# Patient Record
Sex: MALE | Race: WHITE | NOT HISPANIC OR LATINO | Employment: FULL TIME | URBAN - NONMETROPOLITAN AREA
[De-identification: names, ages, dates, MRNs, and addresses within clinical notes are randomized per-mention and may not be internally consistent; named-entity substitution may affect disease eponyms.]

---

## 2017-01-19 ENCOUNTER — TELEPHONE (OUTPATIENT)
Dept: GASTROENTEROLOGY | Facility: CLINIC | Age: 62
End: 2017-01-19

## 2017-01-19 NOTE — TELEPHONE ENCOUNTER
Pt called stating he takes 2 teaspoons of fiver in the morning and a fiber tab in the evenings and he is still having diarrhea in the mornings.

## 2017-03-24 ENCOUNTER — OFFICE VISIT (OUTPATIENT)
Dept: GASTROENTEROLOGY | Facility: CLINIC | Age: 62
End: 2017-03-24

## 2017-03-24 ENCOUNTER — APPOINTMENT (OUTPATIENT)
Dept: LAB | Facility: HOSPITAL | Age: 62
End: 2017-03-24

## 2017-03-24 ENCOUNTER — LAB (OUTPATIENT)
Dept: LAB | Facility: HOSPITAL | Age: 62
End: 2017-03-24

## 2017-03-24 VITALS
OXYGEN SATURATION: 99 % | TEMPERATURE: 97.3 F | WEIGHT: 189 LBS | DIASTOLIC BLOOD PRESSURE: 80 MMHG | BODY MASS INDEX: 27.99 KG/M2 | HEART RATE: 76 BPM | SYSTOLIC BLOOD PRESSURE: 130 MMHG | HEIGHT: 69 IN

## 2017-03-24 DIAGNOSIS — Z72.0 TOBACCO USE: ICD-10-CM

## 2017-03-24 DIAGNOSIS — R19.7 DIARRHEA, UNSPECIFIED TYPE: ICD-10-CM

## 2017-03-24 DIAGNOSIS — R19.7 DIARRHEA, UNSPECIFIED TYPE: Primary | ICD-10-CM

## 2017-03-24 PROCEDURE — 99213 OFFICE O/P EST LOW 20 MIN: CPT | Performed by: NURSE PRACTITIONER

## 2017-03-24 RX ORDER — ATORVASTATIN CALCIUM 40 MG/1
TABLET, FILM COATED ORAL
Refills: 0 | COMMUNITY
Start: 2017-01-16 | End: 2022-06-17 | Stop reason: SDUPTHER

## 2017-03-24 RX ORDER — CETIRIZINE HYDROCHLORIDE 10 MG/1
10 TABLET ORAL DAILY
COMMUNITY

## 2017-03-24 NOTE — PROGRESS NOTES
"[facility] GASTROENTEROLOGY - OFFICE NOTE    3/24/2017    Dereje Salazar   1955    Chief Complaint   Patient presents with   • Diarrhea       pcp dr lara.      HISTORY OF PRESENT ILLNESS    Dereje Salazar is a 61 y.o. male presents for eval of diarrhea.  Has had diarrhea over the last 5 years.  This is described as in the morning he will have one solid stool then followed by at least 4 watery stools.  This can last up to around 11 AM and then he is \"good for the rest today\".  This does affect his daily activities, he states that sometimes did like to go fishing but he cannot because he knows he will have to go to the bathroom.  This can also happen before breakfast.  He usually drinks 2 mugs  of coffee in the morning.  He does smoke as well.  Denies rectal bleeding.  Denies unintentional weight loss.  Denies fevers or chills.  Denies nausea or vomiting.  Denies abdominal pain.  He does take a probiotic in the morning and has done so for at least 1 year.  He has been taking fiber supplements as November 2016 and has not found that this has helped.  He was on antibiotics in the last several months for diverticulitis.  Started pantoprazole summer of 2016.  Always other medicines he has taken for over a year.  Recently had a colonoscopy December 2016 that revealed ascending and sigmoid diverticulosis, recall colonoscopy recommended 5 years.  He had an EGD December 2016 as well which was normal.     Past Medical History:   Diagnosis Date   • Colon polyps    • Diverticulosis    • GERD (gastroesophageal reflux disease)    • Hyperlipidemia    • Hypertension    • IBS (irritable bowel syndrome)    • Knee pain        Past Surgical History:   Procedure Laterality Date   • COLONOSCOPY  2006    polyps present   • COLONOSCOPY  08/20/2010    clean based ulcerations covering ton lumen @ hepatic flexure, (5) polypectomies, moderate diverticulosis left colon   • COLONOSCOPY  11/12/2010    resolved colonic ulcerations, small " polypectomies, moderate diverticulosis   • COLONOSCOPY  12/31/2013   • COLONOSCOPY N/A 12/16/2016    Procedure: COLONOSCOPY WITH ANESTHESIA;  Surgeon: Boris Castañeda MD;  Location: [facility] ENDOSCOPY;  Service:    • ENDOSCOPY  2006    states hiatal hernia   • ENDOSCOPY N/A 12/16/2016    Procedure: ESOPHAGOGASTRODUODENOSCOPY WITH ANESTHESIA;  Surgeon: Boris Castañeda MD;  Location: [facility] ENDOSCOPY;  Service:    • KNEE SURGERY         Outpatient Prescriptions Marked as Taking for the 3/24/17 encounter (Office Visit) with CABRERA Dupont   Medication Sig Dispense Refill   • aspirin 81 MG EC tablet Take 81 mg by mouth daily.     • atorvastatin (LIPITOR) 40 MG tablet TK 1 T PO QD  0   • cetirizine (zyrTEC) 10 MG tablet Take 10 mg by mouth Daily.     • HYDROcodone-acetaminophen (NORCO) 7.5-325 MG per tablet      • Inulin (FIBER CHOICE PO) Take  by mouth Daily.     • Lactobacillus (PROBIOTIC ACIDOPHILUS PO) Take  by mouth.     • losartan-hydrochlorothiazide (HYZAAR) 100-25 MG per tablet TK 1 T PO QD  1   • Multiple Vitamins-Minerals (CENTRUM SILVER PO) Take  by mouth.     • pantoprazole (PROTONIX) 40 MG EC tablet TK 1 T PO Q MORNING  3       No Known Allergies    Social History     Social History   • Marital status:      Spouse name: N/A   • Number of children: N/A   • Years of education: N/A     Occupational History   • Not on file.     Social History Main Topics   • Smoking status: Current Every Day Smoker     Packs/day: 1.00     Types: Cigarettes   • Smokeless tobacco: Not on file   • Alcohol use Yes      Comment: every day   • Drug use: No   • Sexual activity: Not on file     Other Topics Concern   • Not on file     Social History Narrative       Family History   Problem Relation Age of Onset   • Colon cancer Father    • Colon polyps Sister    • Colon cancer Brother        Review of Systems   Constitutional: Negative for appetite change, chills, fatigue, fever and unexpected weight change.   Respiratory:  "Negative for cough, chest tightness, shortness of breath and wheezing.    Cardiovascular: Negative for chest pain and palpitations.   Gastrointestinal: Positive for diarrhea. Negative for abdominal distention, abdominal pain, anal bleeding, blood in stool, constipation, nausea, rectal pain and vomiting.        As mentioned in hpi   Genitourinary: Negative for difficulty urinating, dysuria and hematuria.   Skin: Negative for color change and rash.   Neurological: Negative for dizziness, tremors, seizures, syncope, light-headedness and headaches.   Psychiatric/Behavioral: Negative for confusion. The patient is not nervous/anxious.        Vitals:    03/24/17 0931   BP: 130/80   BP Location: Left arm   Patient Position: Sitting   Cuff Size: Adult   Pulse: 76   Temp: 97.3 °F (36.3 °C)   SpO2: 99%   Weight: 189 lb (85.7 kg)   Height: 69\" (175.3 cm)      Body mass index is 27.91 kg/(m^2).    Physical Exam   Constitutional: He is oriented to person, place, and time. He appears well-developed and well-nourished. No distress.   HENT:   Head: Normocephalic and atraumatic.   Eyes: Pupils are equal, round, and reactive to light. No scleral icterus.   Neck: Normal range of motion. Neck supple. No JVD present. No tracheal deviation present.   Cardiovascular: Normal rate, regular rhythm, normal heart sounds and intact distal pulses.  Exam reveals no gallop and no friction rub.    No murmur heard.  Pulmonary/Chest: Effort normal and breath sounds normal. No stridor. No respiratory distress. He has no wheezes. He has no rales.   Abdominal: Soft. Bowel sounds are normal. He exhibits no distension and no mass. There is no tenderness. There is no rebound and no guarding.   Musculoskeletal: Normal range of motion. He exhibits no edema or deformity.   Neurological: He is alert and oriented to person, place, and time.   Skin: Skin is warm and dry. No rash noted.   Psychiatric: He has a normal mood and affect. His behavior is normal. "   Vitals reviewed.      No results found for this or any previous visit.        ASSESSMENT AND PLAN    Dereje was seen today for diarrhea.    Diagnoses and all orders for this visit:    Diarrhea, unspecified type  -     Gastrointestinal Panel, PCR; Future  -     Fecal Leukocytes    Tobacco use        Long discussion.  Recommend trial of a fod map diet.  I have recommended that he continue taking a probiotic.  I have recommended he continue taking a fiber supplementation.  We are going to check stool studies.  We did discuss considering a trial of either Colestid or Bentyl, he declines at this time.  I have recommended that he cut out tobacco as well as caffeine and he is going to try to do that.  We will see how he does with these changes and have him back for Ov  6 wks.  in the meantime he will let me know if he changes his mind about trial of these other medications mentioned either Colestid or Bentyl.  He just had a colonoscopy so no plans to repeat that at this time since his symptoms are chronic.    Body mass index is 27.91 kg/(m^2).         CABRERA Almendarez/transcription disclaimer:  Much of this encounter note is electronic transcription/translation of spoken language to printed text.  The electronic translation of spoken language may be erroneous, or at times, nonsensical words or phrases may be inadvertently transcribed.  Although I have reviewed the note for such errors, some may still exist.    No results found for this or any previous visit.

## 2017-03-25 LAB
ADV 40+41 DNA STL QL NAA+NON-PROBE: NOT DETECTED
ASTRO TYP 1-8 RNA STL QL NAA+NON-PROBE: NOT DETECTED
C CAYETANENSIS DNA STL QL NAA+NON-PROBE: NOT DETECTED
C DIFF TOX GENS STL QL NAA+PROBE: NOT DETECTED
CAMPY SP DNA.DIARRHEA STL QL NAA+PROBE: NOT DETECTED
CRYPTOSP STL CULT: NOT DETECTED
E COLI DNA SPEC QL NAA+PROBE: NOT DETECTED
E HISTOLYT AG STL-ACNC: NOT DETECTED
EAEC PAA PLAS AGGR+AATA ST NAA+NON-PRB: NOT DETECTED
EC STX1+STX2 GENES STL QL NAA+NON-PROBE: NOT DETECTED
EPEC EAE GENE STL QL NAA+NON-PROBE: NOT DETECTED
ETEC LTA+ST1A+ST1B TOX ST NAA+NON-PROBE: NOT DETECTED
G LAMBLIA DNA SPEC QL NAA+PROBE: NOT DETECTED
NOROVIRUS GI+II RNA STL QL NAA+NON-PROBE: NOT DETECTED
P SHIGELLOIDES DNA STL QL NAA+NON-PROBE: NOT DETECTED
RV RNA STL NAA+PROBE: NOT DETECTED
SALMONELLA DNA SPEC QL NAA+PROBE: NOT DETECTED
SAPO I+II+IV+V RNA STL QL NAA+NON-PROBE: NOT DETECTED
SHIGELLA SP+EIEC IPAH ST NAA+NON-PROBE: NOT DETECTED
V CHOLERAE DNA SPEC QL NAA+PROBE: NOT DETECTED
VIBRIO DNA SPEC NAA+PROBE: NOT DETECTED
YERSINIA STL CULT: NOT DETECTED

## 2017-03-25 PROCEDURE — 87507 IADNA-DNA/RNA PROBE TQ 12-25: CPT | Performed by: NURSE PRACTITIONER

## 2017-03-25 PROCEDURE — 87205 SMEAR GRAM STAIN: CPT | Performed by: NURSE PRACTITIONER

## 2017-03-26 LAB — WBC STL QL MICRO: NORMAL

## 2017-03-27 ENCOUNTER — TELEPHONE (OUTPATIENT)
Dept: GASTROENTEROLOGY | Facility: CLINIC | Age: 62
End: 2017-03-27

## 2017-03-27 RX ORDER — MONTELUKAST SODIUM 4 MG/1
1 TABLET, CHEWABLE ORAL DAILY
Qty: 30 TABLET | Refills: 2 | Status: SHIPPED | OUTPATIENT
Start: 2017-03-27 | End: 2017-06-01 | Stop reason: SDDI

## 2017-03-27 NOTE — TELEPHONE ENCOUNTER
Let  Him know stool studies are all negative. Ask him if diarrhea is any better, if not then we discussed trial of either colestid or questran , ask if  He would  want to try ??/

## 2017-03-27 NOTE — TELEPHONE ENCOUNTER
Ok, i sent  rx for colestid to take once daily. Tell him not to take within 2 hours of other meds.

## 2017-06-01 ENCOUNTER — OFFICE VISIT (OUTPATIENT)
Dept: GASTROENTEROLOGY | Facility: CLINIC | Age: 62
End: 2017-06-01

## 2017-06-01 VITALS
DIASTOLIC BLOOD PRESSURE: 80 MMHG | TEMPERATURE: 97.4 F | SYSTOLIC BLOOD PRESSURE: 132 MMHG | OXYGEN SATURATION: 99 % | HEART RATE: 83 BPM | BODY MASS INDEX: 28.29 KG/M2 | WEIGHT: 191 LBS | HEIGHT: 69 IN

## 2017-06-01 DIAGNOSIS — K59.1 FUNCTIONAL DIARRHEA: Primary | ICD-10-CM

## 2017-06-01 PROCEDURE — 99213 OFFICE O/P EST LOW 20 MIN: CPT | Performed by: INTERNAL MEDICINE

## 2017-06-01 RX ORDER — DICYCLOMINE HYDROCHLORIDE 10 MG/1
10 CAPSULE ORAL 2 TIMES DAILY PRN
Qty: 60 CAPSULE | Refills: 11 | Status: SHIPPED | OUTPATIENT
Start: 2017-06-01 | End: 2018-06-01

## 2017-06-01 NOTE — PROGRESS NOTES
[facility] Gastroenterology    Chief Complaint   Patient presents with   • GI Problem     Here to discuss his diarrhea       Subjective     HPI    Dereje Salazar is a 61 y.o. male who presents with a chief complaint of  Diarrhea.  See last office visit with Soniya for details.  He basically has had diarrhea for 5 years or longer.  He describes this as loose stools.  This occurs usually every morning.  We will have anywhere from 2-5 loose bowel movements early in the morning and evening is good for the rest of the day.  He has tried Imodium but that plugs him up to much.  He does have a blood of bright red blood per rectum.  He had a colonoscopy over 7 years ago noting a small ulcer at his hepatic flexure but is felt to be NSAID induced.  Repeat colonoscopy less than 1 year ago was normal.  He recently took a course of Flagyl for what he thought might be on coming diverticulitis but that subsided after date June he stop the Flagyl after 4 days.  It did not affect his diarrhea.  He believes 2 times in the past he took a Z-Sukhwinder with prednisone and that helped his diarrhea.  He recently took this and his diarrhea is better this week.  Denies any melena or bright red blood per rectum.  His weights been stable.  Denies abdominal pain.  Everything else is going well.     He was here 2 months ago Soniya did prescribe Colestid but he did not want to take it.  He states he was due to cost and he did not think his symptoms were that bad to take it all the time.      Past Medical History:   Diagnosis Date   • Colon polyps    • Diverticulosis    • GERD (gastroesophageal reflux disease)    • Hyperlipidemia    • Hypertension    • IBS (irritable bowel syndrome)    • Knee pain        Past Surgical History:   Procedure Laterality Date   • COLONOSCOPY  2006    polyps present   • COLONOSCOPY  08/20/2010    clean based ulcerations covering ton lumen @ hepatic flexure, (5) polypectomies, moderate diverticulosis left colon   • COLONOSCOPY   11/12/2010    resolved colonic ulcerations, small polypectomies, moderate diverticulosis   • COLONOSCOPY  12/31/2013   • COLONOSCOPY N/A 12/16/2016    Procedure: COLONOSCOPY WITH ANESTHESIA;  Surgeon: Boris Castañeda MD;  Location: [facility] ENDOSCOPY;  Service:    • ENDOSCOPY  2006    states hiatal hernia   • ENDOSCOPY N/A 12/16/2016    Procedure: ESOPHAGOGASTRODUODENOSCOPY WITH ANESTHESIA;  Surgeon: Boris Castañeda MD;  Location: [facility] ENDOSCOPY;  Service:    • KNEE SURGERY           Current Outpatient Prescriptions:   •  aspirin 81 MG EC tablet, Take 81 mg by mouth daily., Disp: , Rfl:   •  atorvastatin (LIPITOR) 40 MG tablet, TK 1 T PO QD, Disp: , Rfl: 0  •  cetirizine (zyrTEC) 10 MG tablet, Take 10 mg by mouth Daily., Disp: , Rfl:   •  HYDROcodone-acetaminophen (NORCO) 7.5-325 MG per tablet, , Disp: , Rfl:   •  Inulin (FIBER CHOICE PO), Take  by mouth Daily., Disp: , Rfl:   •  Lactobacillus (PROBIOTIC ACIDOPHILUS PO), Take  by mouth., Disp: , Rfl:   •  losartan-hydrochlorothiazide (HYZAAR) 100-25 MG per tablet, TK 1 T PO QD, Disp: , Rfl: 1  •  Multiple Vitamins-Minerals (CENTRUM SILVER PO), Take  by mouth., Disp: , Rfl:   •  pantoprazole (PROTONIX) 40 MG EC tablet, TK 1 T PO Q MORNING, Disp: , Rfl: 3  •  dicyclomine (BENTYL) 10 MG capsule, Take 1 capsule by mouth 2 (Two) Times a Day As Needed (prn abdominal cramps and or diarrhea)., Disp: 60 capsule, Rfl: 11    No Known Allergies    Social History     Social History   • Marital status:      Spouse name: N/A   • Number of children: N/A   • Years of education: N/A     Occupational History   • Not on file.     Social History Main Topics   • Smoking status: Current Every Day Smoker     Packs/day: 1.00     Types: Cigarettes   • Smokeless tobacco: Never Used   • Alcohol use Yes      Comment: every day   • Drug use: No   • Sexual activity: Not on file     Other Topics Concern   • Not on file     Social History Narrative       Family History   Problem Relation  Age of Onset   • Colon cancer Father    • Colon polyps Sister    • Colon cancer Brother        Review of Systems  General no fever chills or sweats weight stable  Gastrointestinal: Not present-abdominal pain, constipation,  dysphagia, hematemesis, melena, odynophagia, nausea, vomiting, pyrosis, regurgitation, hematochezia,    Objective     Vitals:    06/01/17 1507   BP: 132/80   Pulse: 83   Temp: 97.4 °F (36.3 °C)   SpO2: 99%       Physical Exam  No acute distress. Vital signs as documented. Skin warm and dry and without overt rashes. EOMI, sclera anicteric.  Neck without JVD or masses. Lungs clear to auscultation bilaterally, no rales. Heart exam notable for regular rhythm, normal sounds and absence of loud murmurs, rubs or gallops. Abdomen is soft, nontender, non distended, normal bowel sounds and without evidence of organomegaly, masses, or abdominal aortic enlargement. Extremities nonedematous, no cyanosis. Neuro alert, moves extremities.    Assessment/Plan      Dereje was seen today for gi problem.    Diagnoses and all orders for this visit:    Functional diarrhea    Other orders  -     dicyclomine (BENTYL) 10 MG capsule; Take 1 capsule by mouth 2 (Two) Times a Day As Needed (prn abdominal cramps and or diarrhea).      His diarrhea does sound functional.  It may be a food intolerance.  I discussed the possibility could be Crohn's disease hidden in his small intestine.  We discussed what that meant we discussed pursuing investigation possible with [facility] labs were not M2A capsule endoscopy.  He states his symptoms are bad enough that he wants to pursue any additional workup.  He did try Librax in the past but knocked him out too much.  That was given to him by his PCP.  I think it be reasonable for him to try up when necessary Bentyl.  I also suggested a healthy diet we talked about food intolerances.  He wishes to try diet in the Bentyl as needed.  He states if he gets any worse or if that does not make a  difference and he may consider additional workup in the future.  Right now he he wishes just to try the diet and when necessary Bentyl which is reasonable.  I will see him back in the office as needed  Continue ongoing management by primary care provider and other specialists.     EMR Dragon/transcription disclaimer:  Much of this encounter note is electronic transcription/translation of spoken language to printed text.  The electronic translation of spoken language may be erroneous, or at times, nonsensical words or phrases may be inadvertently transcribed.  Although I have reviewed the note for such errors, some may still exist.    Boris Castañeda MD  5:09 PM  06/01/17

## 2017-07-05 ENCOUNTER — TELEPHONE (OUTPATIENT)
Dept: GASTROENTEROLOGY | Facility: CLINIC | Age: 62
End: 2017-07-05

## 2017-07-06 NOTE — TELEPHONE ENCOUNTER
He will need to discuss with his dermatologist the etiology of the rash.  It is unlikely that his Bentyl is causing the rash especially of his had it before and was not on Bentyl then.  Regarding staying out of the heat, Bentyl rarely can interfere with sweating thus it is best to avoid being overheated when on Bentyl and 1 should stay well hydrated especially if there are outside in the heat of summer

## 2017-07-06 NOTE — TELEPHONE ENCOUNTER
Spoke with pt. He verbalized understanding. He would like to know if he can drink beer if taking bentyl?

## 2017-10-19 ENCOUNTER — TRANSCRIBE ORDERS (OUTPATIENT)
Dept: ADMINISTRATIVE | Facility: HOSPITAL | Age: 62
End: 2017-10-19

## 2017-10-19 DIAGNOSIS — M54.16 LUMBAR RADICULOPATHY: Primary | ICD-10-CM

## 2017-10-24 ENCOUNTER — APPOINTMENT (OUTPATIENT)
Dept: MRI IMAGING | Facility: HOSPITAL | Age: 62
End: 2017-10-24

## 2017-10-27 ENCOUNTER — HOSPITAL ENCOUNTER (OUTPATIENT)
Dept: MRI IMAGING | Facility: HOSPITAL | Age: 62
Discharge: HOME | End: 2017-10-27
Admitting: PHYSICIAN ASSISTANT

## 2017-10-27 DIAGNOSIS — M54.16 LUMBAR RADICULOPATHY: ICD-10-CM

## 2017-10-27 PROCEDURE — 72148 MRI LUMBAR SPINE W/O DYE: CPT

## 2019-02-18 ENCOUNTER — TRANSCRIBE ORDERS (OUTPATIENT)
Dept: ADMINISTRATIVE | Facility: HOSPITAL | Age: 64
End: 2019-02-18

## 2019-02-18 DIAGNOSIS — R20.2 PARESTHESIA: Primary | ICD-10-CM

## 2019-02-25 ENCOUNTER — HOSPITAL ENCOUNTER (OUTPATIENT)
Dept: NEUROLOGY | Facility: HOSPITAL | Age: 64
Discharge: HOME | End: 2019-02-25
Admitting: INTERNAL MEDICINE

## 2019-02-25 DIAGNOSIS — R20.2 PARESTHESIA: ICD-10-CM

## 2019-02-25 PROCEDURE — 95911 NRV CNDJ TEST 9-10 STUDIES: CPT

## 2019-02-25 PROCEDURE — 95886 MUSC TEST DONE W/N TEST COMP: CPT

## 2019-06-11 ENCOUNTER — TRANSCRIBE ORDERS (OUTPATIENT)
Dept: ADMINISTRATIVE | Facility: HOSPITAL | Age: 64
End: 2019-06-11

## 2019-06-11 DIAGNOSIS — M54.12 CERVICAL RADICULITIS: Primary | ICD-10-CM

## 2019-06-28 ENCOUNTER — HOSPITAL ENCOUNTER (OUTPATIENT)
Dept: MRI IMAGING | Facility: HOSPITAL | Age: 64
Discharge: HOME | End: 2019-06-28
Admitting: PHYSICAL MEDICINE & REHABILITATION

## 2019-06-28 DIAGNOSIS — M54.12 CERVICAL RADICULITIS: ICD-10-CM

## 2019-06-28 PROCEDURE — 72141 MRI NECK SPINE W/O DYE: CPT

## 2021-02-12 ENCOUNTER — TRANSCRIBE ORDERS (OUTPATIENT)
Dept: ADMINISTRATIVE | Facility: HOSPITAL | Age: 66
End: 2021-02-12

## 2021-02-12 DIAGNOSIS — I70.213 INTERMITTENT CLAUDICATION OF BOTH LOWER EXTREMITIES DUE TO ATHEROSCLEROSIS (HCC): Primary | ICD-10-CM

## 2021-02-15 ENCOUNTER — APPOINTMENT (OUTPATIENT)
Dept: ULTRASOUND IMAGING | Facility: HOSPITAL | Age: 66
End: 2021-02-15

## 2021-02-19 ENCOUNTER — HOSPITAL ENCOUNTER (OUTPATIENT)
Dept: ULTRASOUND IMAGING | Facility: HOSPITAL | Age: 66
Discharge: HOME | End: 2021-02-19
Admitting: INTERNAL MEDICINE

## 2021-02-19 DIAGNOSIS — I70.213 INTERMITTENT CLAUDICATION OF BOTH LOWER EXTREMITIES DUE TO ATHEROSCLEROSIS (HCC): ICD-10-CM

## 2021-02-19 PROCEDURE — 93923 UPR/LXTR ART STDY 3+ LVLS: CPT

## 2021-03-16 ENCOUNTER — TRANSCRIBE ORDERS (OUTPATIENT)
Dept: LAB | Facility: HOSPITAL | Age: 66
End: 2021-03-16

## 2021-03-16 ENCOUNTER — TELEPHONE (OUTPATIENT)
Dept: CARDIOLOGY | Facility: CLINIC | Age: 66
End: 2021-03-16

## 2021-03-16 ENCOUNTER — LAB (OUTPATIENT)
Dept: LAB | Facility: HOSPITAL | Age: 66
End: 2021-03-16

## 2021-03-16 ENCOUNTER — OFFICE VISIT (OUTPATIENT)
Dept: CARDIOLOGY | Facility: CLINIC | Age: 66
End: 2021-03-16

## 2021-03-16 VITALS
OXYGEN SATURATION: 98 % | HEIGHT: 69 IN | WEIGHT: 182 LBS | HEART RATE: 71 BPM | BODY MASS INDEX: 26.96 KG/M2 | DIASTOLIC BLOOD PRESSURE: 62 MMHG | SYSTOLIC BLOOD PRESSURE: 112 MMHG

## 2021-03-16 DIAGNOSIS — I20.8 ANGINA AT REST (HCC): ICD-10-CM

## 2021-03-16 DIAGNOSIS — E78.49 OTHER HYPERLIPIDEMIA: ICD-10-CM

## 2021-03-16 DIAGNOSIS — R06.09 DYSPNEA ON EXERTION: ICD-10-CM

## 2021-03-16 DIAGNOSIS — Z01.818 PRE-OP TESTING: Primary | ICD-10-CM

## 2021-03-16 DIAGNOSIS — I10 ESSENTIAL HYPERTENSION: ICD-10-CM

## 2021-03-16 DIAGNOSIS — R94.39 ABNORMAL STRESS TEST: Primary | ICD-10-CM

## 2021-03-16 PROBLEM — I20.89 ANGINA AT REST: Status: ACTIVE | Noted: 2021-03-16

## 2021-03-16 LAB — SARS-COV-2 ORF1AB RESP QL NAA+PROBE: NOT DETECTED

## 2021-03-16 PROCEDURE — 99204 OFFICE O/P NEW MOD 45 MIN: CPT | Performed by: EMERGENCY MEDICINE

## 2021-03-16 PROCEDURE — C9803 HOPD COVID-19 SPEC COLLECT: HCPCS | Performed by: EMERGENCY MEDICINE

## 2021-03-16 PROCEDURE — U0004 COV-19 TEST NON-CDC HGH THRU: HCPCS | Performed by: EMERGENCY MEDICINE

## 2021-03-16 PROCEDURE — 93000 ELECTROCARDIOGRAM COMPLETE: CPT | Performed by: EMERGENCY MEDICINE

## 2021-03-16 RX ORDER — LOSARTAN POTASSIUM 100 MG/1
100 TABLET ORAL DAILY
Status: ON HOLD | COMMUNITY
End: 2021-07-15

## 2021-03-16 RX ORDER — HYDROCHLOROTHIAZIDE 25 MG/1
25 TABLET ORAL DAILY
COMMUNITY
End: 2022-06-17 | Stop reason: SDUPTHER

## 2021-03-16 RX ORDER — CARVEDILOL 3.12 MG/1
3.12 TABLET ORAL 2 TIMES DAILY WITH MEALS
Status: ON HOLD | COMMUNITY
End: 2021-11-04

## 2021-03-16 RX ORDER — ISOSORBIDE MONONITRATE 30 MG/1
30 TABLET, EXTENDED RELEASE ORAL DAILY
Qty: 90 TABLET | Refills: 3 | Status: SHIPPED | OUTPATIENT
Start: 2021-03-16 | End: 2021-07-15 | Stop reason: HOSPADM

## 2021-03-16 NOTE — TELEPHONE ENCOUNTER
PT CALLED WOULD LIKE TO KNOW WHY HE NEEDS TO TAKE THE ISOSORBIDE THE PHARMACIST TOLD HIM IT WAS FOR CHEST PAIN, AND HE ISN'T REALLY HAVING CHEST PAIN    PLEASE ADVISE

## 2021-03-16 NOTE — PROGRESS NOTES
[facility] - CARDIOLOGY  New Patient Initial Outpatient Evaulation    Primary Care Physician: Wilbert Saleem MD    Subjective     Chief Complaint   Patient presents with   • REVERSIBLE ISCHEMIA     NEW PT    • Shortness of Breath   • Dizziness        History of Present Illness    Patient is a very pleasant 65-year-old male with a past medical history of hypertension, hyperlipidemia and nicotine abuse who presents to the cardiology clinic for initial evaluation.  Patient states that he went to Dr. Saleem for lower extremity sensory changes including some numbness as well as cold feet.  He says based on his smoking history, Dr. Saleem checked arterial studies in his legs as well as his carotid arteries.  Both of these test came back without any significant pathology seen.  Patient also admits that he has been having mild episodes of chest pains.  He describes these as a pressure-like sensation that only last for a few seconds and goes away spontaneously.  He says he never pays much attention to them and does not get any associated symptoms.  He underwent a stress test and echocardiogram recently.    The nuclear stress test showed a small region of ischemia in the inferior and inferior lateral wall with a normal ejection fraction.  Echocardiogram showed enlarged left atrium and right ventricle and LVH.  Normal systolic function and normal valvular function.  Review of the records show that the carotid duplex showed less than 50% bilaterally.  Arterial Dopplers showed normal ABIs bilaterally.    Current cardiac medications include aspirin 81, Lipitor 40, losartan 100, HCTZ 25 and Coreg 3.125 twice daily.  He is also on co-Q10..  Blood pressure and heart rate well controlled.        Review of Systems   Constitutional: Negative for diaphoresis, fever and malaise/fatigue.   HENT: Negative for congestion.    Eyes: Negative for vision loss in left eye and vision loss in right eye.   Cardiovascular: Positive for chest pain,  claudication and dyspnea on exertion. Negative for irregular heartbeat, leg swelling, orthopnea, palpitations and syncope.   Respiratory: Negative for cough, shortness of breath and wheezing.    Hematologic/Lymphatic: Negative for adenopathy.   Skin: Negative for rash.   Musculoskeletal: Negative for joint pain and joint swelling.   Gastrointestinal: Negative for abdominal pain, diarrhea, nausea and vomiting.   Neurological: Positive for dizziness. Negative for excessive daytime sleepiness, focal weakness, light-headedness, numbness and weakness.   Psychiatric/Behavioral: Negative for depression. The patient does not have insomnia.         Otherwise complete ROS reviewed and negative except as mentioned in the HPI.      Past Medical History:   Past Medical History:   Diagnosis Date   • Colon polyps    • Diverticulosis    • GERD (gastroesophageal reflux disease)    • Hyperlipidemia    • Hypertension    • IBS (irritable bowel syndrome)    • Knee pain        Past Surgical History:  Past Surgical History:   Procedure Laterality Date   • COLONOSCOPY  2006    polyps present   • COLONOSCOPY  08/20/2010    clean based ulcerations covering ton lumen @ hepatic flexure, (5) polypectomies, moderate diverticulosis left colon   • COLONOSCOPY  11/12/2010    resolved colonic ulcerations, small polypectomies, moderate diverticulosis   • COLONOSCOPY  12/31/2013   • COLONOSCOPY N/A 12/16/2016    Procedure: COLONOSCOPY WITH ANESTHESIA;  Surgeon: Boris Castañeda MD;  Location: [facility] ENDOSCOPY;  Service:    • ENDOSCOPY  2006    states hiatal hernia   • ENDOSCOPY N/A 12/16/2016    Procedure: ESOPHAGOGASTRODUODENOSCOPY WITH ANESTHESIA;  Surgeon: Boris Castañeda MD;  Location: [facility] ENDOSCOPY;  Service:    • KNEE SURGERY         Family History: family history includes Colon cancer in his brother and father; Colon polyps in his sister; Heart disease in his mother.    Social History:  reports that he has been smoking cigarettes. He has  "been smoking about 1.00 pack per day. He has never used smokeless tobacco. He reports current alcohol use. He reports that he does not use drugs.    Medications:  Prior to Admission medications    Medication Sig Start Date End Date Taking? Authorizing Provider   aspirin 81 MG EC tablet Take 81 mg by mouth daily.   Yes Marsha Rosas MD   atorvastatin (LIPITOR) 40 MG tablet TK 1 T PO QD 1/16/17  Yes Marsha Rosas MD   carvedilol (COREG) 3.125 MG tablet Take 3.125 mg by mouth 2 (Two) Times a Day With Meals.   Yes Marsha Rosas MD   cetirizine (zyrTEC) 10 MG tablet Take 10 mg by mouth Daily.   Yes Marsha Rosas MD   Coenzyme Q10 (CO Q 10 PO) Take  by mouth.   Yes Marsha Rosas MD   Cyanocobalamin (VITAMIN B 12 PO) Take  by mouth.   Yes Marsha Rosas MD   hydroCHLOROthiazide (HYDRODIURIL) 25 MG tablet Take 25 mg by mouth Daily.   Yes Marsha Rosas MD   HYDROcodone-acetaminophen (NORCO) 7.5-325 MG per tablet  10/10/16  Yes Marsha Rosas MD   losartan (COZAAR) 100 MG tablet Take 100 mg by mouth Daily.   Yes Marsha Rosas MD   Multiple Vitamins-Minerals (CENTRUM SILVER PO) Take  by mouth.   Yes Marsha Rosas MD   pantoprazole (PROTONIX) 40 MG EC tablet TK 1 T PO Q MORNING 8/10/16  Yes Marsha Rosas MD   POTASSIUM CHLORIDE PO Take  by mouth.   Yes Marsha Rosas MD   Inulin (FIBER CHOICE PO) Take  by mouth Daily.    Marsha Rosas MD   isosorbide mononitrate (IMDUR) 30 MG 24 hr tablet Take 1 tablet by mouth Daily. 3/16/21   Finn Mustafa,    Lactobacillus (PROBIOTIC ACIDOPHILUS PO) Take  by mouth.    Marsha Rosas MD   losartan-hydrochlorothiazide (HYZAAR) 100-25 MG per tablet TK 1 T PO QD 7/12/16   Marsha Rosas MD     Allergies:  No Known Allergies    Objective     Vital Signs: /62   Pulse 71   Ht 175.3 cm (69\")   Wt 82.6 kg (182 lb)   SpO2 98%   BMI 26.88 kg/m²     Vitals and " nursing note reviewed.   Constitutional:       Appearance: Normal and healthy appearance. Well-developed and not in distress.   Eyes:      Extraocular Movements: Extraocular movements intact.      Pupils: Pupils are equal, round, and reactive to light.   HENT:      Head: Normocephalic and atraumatic.    Mouth/Throat:      Pharynx: Oropharynx is clear.   Neck:      Vascular: JVD normal.      Trachea: Trachea normal.   Pulmonary:      Effort: Pulmonary effort is normal.      Breath sounds: Normal breath sounds. No wheezing. No rhonchi. No rales.   Cardiovascular:      PMI at left midclavicular line. Normal rate. Regular rhythm. Normal S1. Normal S2.      Murmurs: There is a grade 2/6 systolic murmur.      No gallop. No click. No rub.   Abdominal:      General: Bowel sounds are normal.      Palpations: Abdomen is soft.      Tenderness: There is no abdominal tenderness.   Musculoskeletal: Normal range of motion.      Cervical back: Normal range of motion and neck supple. Skin:     General: Skin is warm and dry.      Capillary Refill: Capillary refill takes less than 2 seconds.   Feet:      Right foot:      Skin integrity: Skin integrity normal.      Left foot:      Skin integrity: Skin integrity normal.   Neurological:      Mental Status: Alert and oriented to person, place and time.      Cranial Nerves: Cranial nerves are intact.      Sensory: Sensation is intact.      Motor: Motor function is intact.      Coordination: Coordination is intact.   Psychiatric:         Speech: Speech normal.         Behavior: Behavior is cooperative.         Results Reviewed:      ECG 12 Lead    Date/Time: 3/16/2021 12:41 PM  Performed by: Finn Mustafa DO  Authorized by: Finn Mustafa DO   Comparison: not compared with previous ECG   Previous ECG: no previous ECG available  Rhythm: sinus rhythm  Rate: normal  Conduction: 1st degree AV block  ST Segments: ST segments normal  T Waves: T waves normal  QRS axis:  normal  Other: no other findings    Clinical impression: abnormal EKG              No results found for: CHOL, TRIG, HDL, VLDL, LDLHDL  No results found for: HGBA1C    Assessment / Plan        Problem List Items Addressed This Visit        Cardiac and Vasculature    Essential hypertension    Relevant Medications    losartan (COZAAR) 100 MG tablet    hydroCHLOROthiazide (HYDRODIURIL) 25 MG tablet    carvedilol (COREG) 3.125 MG tablet    Other hyperlipidemia    Abnormal stress test - Primary    Relevant Orders    Case Request Cath Lab: Left Heart Cath w poss intervention, right radial    Angina at rest (CMS/HCC)    Relevant Medications    carvedilol (COREG) 3.125 MG tablet    isosorbide mononitrate (IMDUR) 30 MG 24 hr tablet    Other Relevant Orders    Case Request Cath Lab: Left Heart Cath w poss intervention, right radial    Dyspnea on exertion        Plan:    65-year-old male with history of hypertension and hyperlipidemia presents to the cardiology clinic today with an abnormal stress test performed recently at an outside facility concerning for ischemia in the inferior and inferior lateral distribution.     Cardiology recommendations:  1.  We will schedule the patient for a diagnostic left heart catheterization via right radial approach on Friday to assess his coronary anatomy  2.  Patient is already on optimal medical therapy including aspirin 81, high intensity statin with Lipitor 40, Coreg 3.125 twice daily, HCTZ 25, Cozaar 100.  Recommend continuing all of these at their current doses.  3.  Start the patient on Imdur 30 which would give him his second antianginal agent.  4.  Follow-up in the cardiology clinic in 1 month    Thank you Dr. Saleem for this referral.  Please call with any questions at this time.  My personal cell phone number is [phone].          Finn Mustafa,    Interventional cardiology  [facility]  03/16/21   12:43 CDT

## 2021-03-23 ENCOUNTER — APPOINTMENT (OUTPATIENT)
Dept: GENERAL RADIOLOGY | Facility: HOSPITAL | Age: 66
End: 2021-03-23

## 2021-03-23 ENCOUNTER — HOSPITAL ENCOUNTER (EMERGENCY)
Facility: HOSPITAL | Age: 66
Discharge: HOME | End: 2021-03-23
Admitting: EMERGENCY MEDICINE

## 2021-03-23 VITALS
BODY MASS INDEX: 27.55 KG/M2 | DIASTOLIC BLOOD PRESSURE: 70 MMHG | RESPIRATION RATE: 17 BRPM | OXYGEN SATURATION: 99 % | WEIGHT: 186 LBS | SYSTOLIC BLOOD PRESSURE: 123 MMHG | HEART RATE: 68 BPM | HEIGHT: 69 IN | TEMPERATURE: 98.5 F

## 2021-03-23 DIAGNOSIS — I20.8 ANGINA AT REST (HCC): Primary | ICD-10-CM

## 2021-03-23 LAB
ALBUMIN SERPL-MCNC: 4.2 G/DL (ref 3.5–5.2)
ALBUMIN/GLOB SERPL: 1.7 G/DL
ALP SERPL-CCNC: 78 U/L (ref 39–117)
ALT SERPL W P-5'-P-CCNC: 21 U/L (ref 1–41)
ANION GAP SERPL CALCULATED.3IONS-SCNC: 10 MMOL/L (ref 5–15)
APTT PPP: 49.7 SECONDS (ref 24.1–35)
AST SERPL-CCNC: 20 U/L (ref 1–40)
BASOPHILS # BLD AUTO: 0.03 10*3/MM3 (ref 0–0.2)
BASOPHILS NFR BLD AUTO: 0.5 % (ref 0–1.5)
BILIRUB SERPL-MCNC: <0.2 MG/DL (ref 0–1.2)
BUN SERPL-MCNC: 14 MG/DL (ref 8–23)
BUN/CREAT SERPL: 17.7 (ref 7–25)
CALCIUM SPEC-SCNC: 9.3 MG/DL (ref 8.6–10.5)
CHLORIDE SERPL-SCNC: 95 MMOL/L (ref 98–107)
CO2 SERPL-SCNC: 26 MMOL/L (ref 22–29)
CREAT SERPL-MCNC: 0.79 MG/DL (ref 0.76–1.27)
D DIMER PPP FEU-MCNC: <0.22 MG/L (FEU) (ref 0–0.5)
DEPRECATED RDW RBC AUTO: 41.3 FL (ref 37–54)
EOSINOPHIL # BLD AUTO: 0.12 10*3/MM3 (ref 0–0.4)
EOSINOPHIL NFR BLD AUTO: 1.8 % (ref 0.3–6.2)
ERYTHROCYTE [DISTWIDTH] IN BLOOD BY AUTOMATED COUNT: 12.5 % (ref 12.3–15.4)
GFR SERPL CREATININE-BSD FRML MDRD: 98 ML/MIN/1.73
GLOBULIN UR ELPH-MCNC: 2.5 GM/DL
GLUCOSE SERPL-MCNC: 155 MG/DL (ref 65–99)
HCT VFR BLD AUTO: 35.5 % (ref 37.5–51)
HGB BLD-MCNC: 12.7 G/DL (ref 13–17.7)
HOLD SPECIMEN: NORMAL
HOLD SPECIMEN: NORMAL
IMM GRANULOCYTES # BLD AUTO: 0.02 10*3/MM3 (ref 0–0.05)
IMM GRANULOCYTES NFR BLD AUTO: 0.3 % (ref 0–0.5)
INR PPP: 0.91 (ref 0.91–1.09)
LYMPHOCYTES # BLD AUTO: 1.67 10*3/MM3 (ref 0.7–3.1)
LYMPHOCYTES NFR BLD AUTO: 25.6 % (ref 19.6–45.3)
MCH RBC QN AUTO: 32.1 PG (ref 26.6–33)
MCHC RBC AUTO-ENTMCNC: 35.8 G/DL (ref 31.5–35.7)
MCV RBC AUTO: 89.6 FL (ref 79–97)
MONOCYTES # BLD AUTO: 0.74 10*3/MM3 (ref 0.1–0.9)
MONOCYTES NFR BLD AUTO: 11.3 % (ref 5–12)
NEUTROPHILS NFR BLD AUTO: 3.94 10*3/MM3 (ref 1.7–7)
NEUTROPHILS NFR BLD AUTO: 60.5 % (ref 42.7–76)
NRBC BLD AUTO-RTO: 0 /100 WBC (ref 0–0.2)
NT-PROBNP SERPL-MCNC: 87.8 PG/ML (ref 0–900)
PLATELET # BLD AUTO: 240 10*3/MM3 (ref 140–450)
PMV BLD AUTO: 9.1 FL (ref 6–12)
POTASSIUM SERPL-SCNC: 3.3 MMOL/L (ref 3.5–5.2)
PROT SERPL-MCNC: 6.7 G/DL (ref 6–8.5)
PROTHROMBIN TIME: 11.9 SECONDS (ref 11.9–14.6)
RBC # BLD AUTO: 3.96 10*6/MM3 (ref 4.14–5.8)
SODIUM SERPL-SCNC: 131 MMOL/L (ref 136–145)
TROPONIN T SERPL-MCNC: <0.01 NG/ML (ref 0–0.03)
TROPONIN T SERPL-MCNC: <0.01 NG/ML (ref 0–0.03)
WBC # BLD AUTO: 6.52 10*3/MM3 (ref 3.4–10.8)
WHOLE BLOOD HOLD SPECIMEN: NORMAL
WHOLE BLOOD HOLD SPECIMEN: NORMAL

## 2021-03-23 PROCEDURE — 83880 ASSAY OF NATRIURETIC PEPTIDE: CPT | Performed by: NURSE PRACTITIONER

## 2021-03-23 PROCEDURE — 80053 COMPREHEN METABOLIC PANEL: CPT | Performed by: EMERGENCY MEDICINE

## 2021-03-23 PROCEDURE — 85025 COMPLETE CBC W/AUTO DIFF WBC: CPT | Performed by: EMERGENCY MEDICINE

## 2021-03-23 PROCEDURE — 85730 THROMBOPLASTIN TIME PARTIAL: CPT | Performed by: NURSE PRACTITIONER

## 2021-03-23 PROCEDURE — 93005 ELECTROCARDIOGRAM TRACING: CPT | Performed by: EMERGENCY MEDICINE

## 2021-03-23 PROCEDURE — 85379 FIBRIN DEGRADATION QUANT: CPT | Performed by: NURSE PRACTITIONER

## 2021-03-23 PROCEDURE — 99284 EMERGENCY DEPT VISIT MOD MDM: CPT

## 2021-03-23 PROCEDURE — 85610 PROTHROMBIN TIME: CPT | Performed by: NURSE PRACTITIONER

## 2021-03-23 PROCEDURE — 71045 X-RAY EXAM CHEST 1 VIEW: CPT

## 2021-03-23 PROCEDURE — 93010 ELECTROCARDIOGRAM REPORT: CPT | Performed by: INTERNAL MEDICINE

## 2021-03-23 PROCEDURE — 84484 ASSAY OF TROPONIN QUANT: CPT | Performed by: EMERGENCY MEDICINE

## 2021-03-23 RX ORDER — SODIUM CHLORIDE 0.9 % (FLUSH) 0.9 %
10 SYRINGE (ML) INJECTION AS NEEDED
Status: DISCONTINUED | OUTPATIENT
Start: 2021-03-23 | End: 2021-03-24 | Stop reason: HOSPADM

## 2021-03-23 RX ADMIN — SODIUM CHLORIDE 500 ML: 9 INJECTION, SOLUTION INTRAVENOUS at 20:29

## 2021-03-23 NOTE — PROGRESS NOTES
Subjective    Mr. Salazar is 65 y.o. male    Chief Complaint: Renal Cyst    History of Present Illness     Abnormal radiographic finding   This patient presents with a possible abnormal finding of the right kidneyon Renal US and MRI. This is a  new finding. This test was done 1 month(s) ago. Onset is sudden. This was done in context of evaluation for an unrelated illness. Symptoms include No evidence of hematuria, dysuria, or pain.       The following portions of the patient's history were reviewed and updated as appropriate: allergies, current medications, past family history, past medical history, past social history, past surgical history and problem list.    Review of Systems   Constitutional: Negative for appetite change and fever.   HENT: Negative for hearing loss and sore throat.    Eyes: Negative for pain and redness.   Respiratory: Negative for cough and shortness of breath.    Cardiovascular: Negative for chest pain and leg swelling.   Gastrointestinal: Negative for anal bleeding, nausea and vomiting.   Endocrine: Negative for cold intolerance and heat intolerance.   Genitourinary: Negative for dysuria, flank pain, frequency, hematuria and urgency.   Musculoskeletal: Negative for joint swelling and myalgias.   Skin: Negative for color change and rash.   Allergic/Immunologic: Negative for immunocompromised state.   Neurological: Negative for dizziness and speech difficulty.   Hematological: Negative for adenopathy. Does not bruise/bleed easily.   Psychiatric/Behavioral: Negative for dysphoric mood and suicidal ideas.         Current Outpatient Medications:   •  aspirin 81 MG EC tablet, Take 81 mg by mouth daily., Disp: , Rfl:   •  atorvastatin (LIPITOR) 40 MG tablet, TK 1 T PO QD, Disp: , Rfl: 0  •  cetirizine (zyrTEC) 10 MG tablet, Take 10 mg by mouth Daily., Disp: , Rfl:   •  Coenzyme Q10 (CO Q 10 PO), Take  by mouth., Disp: , Rfl:   •  Cyanocobalamin (VITAMIN B 12 PO), Take  by mouth., Disp: , Rfl:   •   hydroCHLOROthiazide (HYDRODIURIL) 25 MG tablet, Take 25 mg by mouth Daily., Disp: , Rfl:   •  HYDROcodone-acetaminophen (NORCO) 7.5-325 MG per tablet, , Disp: , Rfl:   •  Inulin (FIBER CHOICE PO), Take  by mouth Daily., Disp: , Rfl:   •  isosorbide mononitrate (IMDUR) 30 MG 24 hr tablet, Take 1 tablet by mouth Daily., Disp: 90 tablet, Rfl: 3  •  Lactobacillus (PROBIOTIC ACIDOPHILUS PO), Take  by mouth., Disp: , Rfl:   •  losartan (COZAAR) 100 MG tablet, Take 100 mg by mouth Daily., Disp: , Rfl:   •  Multiple Vitamins-Minerals (CENTRUM SILVER PO), Take  by mouth., Disp: , Rfl:   •  pantoprazole (PROTONIX) 40 MG EC tablet, TK 1 T PO Q MORNING, Disp: , Rfl: 3  •  POTASSIUM CHLORIDE PO, Take  by mouth., Disp: , Rfl:   •  carvedilol (COREG) 3.125 MG tablet, Take 3.125 mg by mouth 2 (Two) Times a Day With Meals., Disp: , Rfl:   •  losartan-hydrochlorothiazide (HYZAAR) 100-25 MG per tablet, TK 1 T PO QD, Disp: , Rfl: 1    Past Medical History:   Diagnosis Date   • Colon polyps    • Diverticulosis    • GERD (gastroesophageal reflux disease)    • Hyperlipidemia    • Hypertension    • IBS (irritable bowel syndrome)    • Knee pain        Past Surgical History:   Procedure Laterality Date   • COLONOSCOPY  2006    polyps present   • COLONOSCOPY  08/20/2010    clean based ulcerations covering ton lumen @ hepatic flexure, (5) polypectomies, moderate diverticulosis left colon   • COLONOSCOPY  11/12/2010    resolved colonic ulcerations, small polypectomies, moderate diverticulosis   • COLONOSCOPY  12/31/2013   • COLONOSCOPY N/A 12/16/2016    Procedure: COLONOSCOPY WITH ANESTHESIA;  Surgeon: Boris Castañeda MD;  Location: [facility] ENDOSCOPY;  Service:    • ENDOSCOPY  2006    states hiatal hernia   • ENDOSCOPY N/A 12/16/2016    Procedure: ESOPHAGOGASTRODUODENOSCOPY WITH ANESTHESIA;  Surgeon: Boris Castañeda MD;  Location: [facility] ENDOSCOPY;  Service:    • KNEE SURGERY         Social History     Socioeconomic History   • Marital  "status:      Spouse name: Not on file   • Number of children: Not on file   • Years of education: Not on file   • Highest education level: Not on file   Tobacco Use   • Smoking status: Current Every Day Smoker     Packs/day: 1.00     Types: Cigarettes   • Smokeless tobacco: Never Used   Vaping Use   • Vaping Use: Never used   Substance and Sexual Activity   • Alcohol use: Yes     Comment: every day   • Drug use: No   • Sexual activity: Defer       Family History   Problem Relation Age of Onset   • Colon cancer Father    • Colon polyps Sister    • Colon cancer Brother    • Heart disease Mother        Objective    Temp 96.9 °F (36.1 °C) (Temporal)   Ht 175.3 cm (69\")   Wt 83.6 kg (184 lb 3.2 oz)   BMI 27.20 kg/m²     Physical Exam  Vitals reviewed.   Constitutional:       General: He is not in acute distress.     Appearance: He is well-developed. He is not diaphoretic.   HENT:      Nose: Nose normal.   Neck:      Thyroid: No thyromegaly.      Trachea: No tracheal deviation.   Cardiovascular:      Rate and Rhythm: Normal rate and regular rhythm.      Comments: No significant edema or tenderness   Pulmonary:      Effort: No accessory muscle usage or respiratory distress.      Breath sounds: Normal breath sounds.   Abdominal:      General: Bowel sounds are normal. There is no distension.      Palpations: Abdomen is soft. There is no mass.      Tenderness: There is no abdominal tenderness. There is no guarding or rebound.      Hernia: No hernia is present.      Comments: Stool specimen is not indicated for my portion of the exam   Genitourinary:     Penis: Normal. No tenderness (no lesion or deformities).       Testes: Normal.         Right: Mass, tenderness or swelling not present.         Left: Mass, tenderness or swelling not present.      Prostate: Tender: no nodules.      Rectum: Guaiac result negative. No mass or tenderness. Normal anal tone.      Comments:  The urethral meatus normal in position without " evidence of stricture. Left  Epididymis without mass or tenderness. Right spermatocele.  Vas Deferens is palpably normal.Anus and perineum without mass or tenderness. The seminal vesicle are without mass or enlargement. The prostate is approximately 40 ml. It is Symmetric, with a Soft consistency. There are no nodules present. . The seminal vesicles are Not palpable due to the size of the prostate.    Musculoskeletal:      Cervical back: Normal range of motion and neck supple.   Lymphadenopathy:      Cervical: No cervical adenopathy.      Lower Body: No right inguinal adenopathy. No left inguinal adenopathy.   Skin:     General: Skin is warm and dry.      Coloration: Skin is not pale.      Findings: No rash.   Neurological:      Mental Status: He is alert and oriented to person, place, and time.   Psychiatric:         Behavior: Behavior normal.         Renal ultrasound independent review    The renal ultrasound is available for me to review.  Treatment recommendations require an independent review.  This film has been reviewed by the radiologist to determine any non urologic abnormalities that are presents.  However, I very closely inspected the kidneys for size, symmetry, contour, parenchymal thickness, perinephric reaction, presence of calcifications, and intrarenal dilation of the collecting system.       The right kidney appears 4.8cm septated cyst    The left kidney appears simple cyst < 1cm    The bladder appears normal on thisultrsaound.  The bladder appears normal in thickness.  There no masses or stones seen on this exam.       MRI independent reivew    The MRI scan of the abdomen/pelvis done with and without contrast is available for me to review.  Treatment recommendations require an independent review.  First I scanned the liver, spleen, and bowel pattern.  The retroperitoneum including the major vessels and lymphatic packages are briefly reviewed.  This film as been reviewed by the radiologist to  determine any non urologic abnormalities that are present.  The kidneys are closely inspected for size, symmetry, contour, parenchymal thickness, perinephric reaction, presence of calcifications, and intrarenal dilation of the collecting system.  The ureters are inspected for their course, caliber, and any calcifications.  The bladder is inspected for its thickness, size, and presence of any calcifications.  This scan shows:    The right kidney appears cyst in upper pole 4.8cm with septation, no enchacement    The left kidney appears <1cm cyst upper pole     The bladder appears normal on this non-contrasted CT scan.  The bladder appears normal in thickness.  There no masses or stones seen on this exam.       Results for orders placed or performed during the hospital encounter of 03/23/21   Comprehensive Metabolic Panel    Specimen: Blood   Result Value Ref Range    Glucose 155 (H) 65 - 99 mg/dL    BUN 14 8 - 23 mg/dL    Creatinine 0.79 0.76 - 1.27 mg/dL    Sodium 131 (L) 136 - 145 mmol/L    Potassium 3.3 (L) 3.5 - 5.2 mmol/L    Chloride 95 (L) 98 - 107 mmol/L    CO2 26.0 22.0 - 29.0 mmol/L    Calcium 9.3 8.6 - 10.5 mg/dL    Total Protein 6.7 6.0 - 8.5 g/dL    Albumin 4.20 3.50 - 5.20 g/dL    ALT (SGPT) 21 1 - 41 U/L    AST (SGOT) 20 1 - 40 U/L    Alkaline Phosphatase 78 39 - 117 U/L    Total Bilirubin <0.2 0.0 - 1.2 mg/dL    eGFR Non African Amer 98 >60 mL/min/1.73    Globulin 2.5 gm/dL    A/G Ratio 1.7 g/dL    BUN/Creatinine Ratio 17.7 7.0 - 25.0    Anion Gap 10.0 5.0 - 15.0 mmol/L   Troponin    Specimen: Blood   Result Value Ref Range    Troponin T <0.010 0.000 - 0.030 ng/mL   Troponin    Specimen: Blood   Result Value Ref Range    Troponin T <0.010 0.000 - 0.030 ng/mL   CBC Auto Differential    Specimen: Blood   Result Value Ref Range    WBC 6.52 3.40 - 10.80 10*3/mm3    RBC 3.96 (L) 4.14 - 5.80 10*6/mm3    Hemoglobin 12.7 (L) 13.0 - 17.7 g/dL    Hematocrit 35.5 (L) 37.5 - 51.0 %    MCV 89.6 79.0 - 97.0 fL    MCH  32.1 26.6 - 33.0 pg    MCHC 35.8 (H) 31.5 - 35.7 g/dL    RDW 12.5 12.3 - 15.4 %    RDW-SD 41.3 37.0 - 54.0 fl    MPV 9.1 6.0 - 12.0 fL    Platelets 240 140 - 450 10*3/mm3    Neutrophil % 60.5 42.7 - 76.0 %    Lymphocyte % 25.6 19.6 - 45.3 %    Monocyte % 11.3 5.0 - 12.0 %    Eosinophil % 1.8 0.3 - 6.2 %    Basophil % 0.5 0.0 - 1.5 %    Immature Grans % 0.3 0.0 - 0.5 %    Neutrophils, Absolute 3.94 1.70 - 7.00 10*3/mm3    Lymphocytes, Absolute 1.67 0.70 - 3.10 10*3/mm3    Monocytes, Absolute 0.74 0.10 - 0.90 10*3/mm3    Eosinophils, Absolute 0.12 0.00 - 0.40 10*3/mm3    Basophils, Absolute 0.03 0.00 - 0.20 10*3/mm3    Immature Grans, Absolute 0.02 0.00 - 0.05 10*3/mm3    nRBC 0.0 0.0 - 0.2 /100 WBC   Protime-INR    Specimen: Blood   Result Value Ref Range    Protime 11.9 11.9 - 14.6 Seconds    INR 0.91 0.91 - 1.09   aPTT    Specimen: Blood   Result Value Ref Range    PTT 49.7 (H) 24.1 - 35.0 seconds   BNP    Specimen: Blood   Result Value Ref Range    proBNP 87.8 0.0 - 900.0 pg/mL   D-dimer, Quantitative    Specimen: Blood   Result Value Ref Range    D-Dimer, Quantitative <0.22 0.00 - 0.50 mg/L (FEU)   ECG 12 Lead   Result Value Ref Range    QT Interval 338 ms    QTC Interval 422 ms   ECG 12 Lead   Result Value Ref Range    QT Interval 362 ms    QTC Interval 398 ms   Light Blue Top   Result Value Ref Range    Extra Tube hold for add-on    Green Top (Gel)   Result Value Ref Range    Extra Tube Hold for add-ons.    Lavender Top   Result Value Ref Range    Extra Tube hold for add-on    Red Top   Result Value Ref Range    Extra Tube Hold for add-ons.      Assessment and Plan    Diagnoses and all orders for this visit:    1. Renal cyst (Primary)  -     Cancel: POC Urinalysis Dipstick, Multipro  -     US Renal Bilateral; Future    2. BPH with urinary obstruction    3. Spermatocele of epididymis, single        Patient with 4.8 cm septated renal cyst on the right.  He has a less than 1 cm proteinaceous cyst on the left.   Septation bumps this into a Bosniak 2F lesion.  There is no enhancement.  He will follow-up in 1 year with renal ultrasound as I recommended observation only.    Does have a spermatocele which of also recommend observation as this does not cause him pain or discomfort.    Voiding symptoms are minimal and we will continue to observe.

## 2021-03-24 LAB
QT INTERVAL: 338 MS
QT INTERVAL: 362 MS
QTC INTERVAL: 398 MS
QTC INTERVAL: 422 MS

## 2021-03-24 NOTE — ED PROVIDER NOTES
Subjective   65 yom presents with c/o intermittent chest pain since 230 this afternoon.  He states the pain is located in the left side of his chest and is sharp.  He states he is having some SOB but he believes it is because he is nervous. The pain does not worsen with a deep breath.  He has no chest tenderness.  He denies cough or fever.  He states he recently had a stress test and followed up with Dr Mustafa, cardiologist. He was initially scheduled to have a heart catheterization but he was notified by Dr Mustafa's office they thought it was a false positive stress test and a heart cath wasn't warranted. He denies nausea.      Per medical record review,  recent nuclear stress test showed a small region of ischemia in the inferior and inferior lateral wall with a normal ejection fraction.  Echocardiogram showed enlarged left atrium and right ventricle and LVH.  Normal systolic function and normal valvular function.  Review of the records show that the carotid duplex showed less than 50% bilaterally.           Review of Systems   Constitutional: Negative for activity change, appetite change, fatigue and fever.   HENT: Negative for congestion, ear pain, facial swelling and sore throat.    Eyes: Negative for discharge and visual disturbance.   Respiratory: Positive for shortness of breath. Negative for apnea, chest tightness, wheezing and stridor.    Cardiovascular: Positive for chest pain. Negative for palpitations.   Gastrointestinal: Negative for abdominal distention, abdominal pain, diarrhea, nausea and vomiting.   Genitourinary: Negative for difficulty urinating and dysuria.   Musculoskeletal: Negative for arthralgias and myalgias.   Skin: Negative for rash and wound.   Neurological: Negative for dizziness and seizures.   Psychiatric/Behavioral: Negative for agitation and confusion.       Past Medical History:   Diagnosis Date   • Colon polyps    • Diverticulosis    • GERD (gastroesophageal reflux disease)     • Hyperlipidemia    • Hypertension    • IBS (irritable bowel syndrome)    • Knee pain        Allergies   Allergen Reactions   • Other Other (See Comments)     Patient states he is allergic to some abx but cant name them       Past Surgical History:   Procedure Laterality Date   • COLONOSCOPY  2006    polyps present   • COLONOSCOPY  08/20/2010    clean based ulcerations covering ton lumen @ hepatic flexure, (5) polypectomies, moderate diverticulosis left colon   • COLONOSCOPY  11/12/2010    resolved colonic ulcerations, small polypectomies, moderate diverticulosis   • COLONOSCOPY  12/31/2013   • COLONOSCOPY N/A 12/16/2016    Procedure: COLONOSCOPY WITH ANESTHESIA;  Surgeon: Boris Castañeda MD;  Location: [facility] ENDOSCOPY;  Service:    • ENDOSCOPY  2006    states hiatal hernia   • ENDOSCOPY N/A 12/16/2016    Procedure: ESOPHAGOGASTRODUODENOSCOPY WITH ANESTHESIA;  Surgeon: Boris Castañeda MD;  Location: [facility] ENDOSCOPY;  Service:    • KNEE SURGERY         Family History   Problem Relation Age of Onset   • Colon cancer Father    • Colon polyps Sister    • Colon cancer Brother    • Heart disease Mother        Social History     Socioeconomic History   • Marital status:      Spouse name: Not on file   • Number of children: Not on file   • Years of education: Not on file   • Highest education level: Not on file   Tobacco Use   • Smoking status: Current Every Day Smoker     Packs/day: 1.00     Types: Cigarettes   • Smokeless tobacco: Never Used   Vaping Use   • Vaping Use: Never used   Substance and Sexual Activity   • Alcohol use: Yes     Comment: every day   • Drug use: No   • Sexual activity: Defer           Objective   Physical Exam  Vitals and nursing note reviewed.   Constitutional:       Appearance: He is well-developed.   HENT:      Head: Normocephalic.   Eyes:      Pupils: Pupils are equal, round, and reactive to light.   Cardiovascular:      Rate and Rhythm: Normal rate and regular rhythm.      Heart  sounds: No murmur heard.     Pulmonary:      Effort: Pulmonary effort is normal.      Breath sounds: Normal breath sounds.   Chest:      Chest wall: No mass, deformity or tenderness.   Abdominal:      General: Bowel sounds are normal.      Palpations: Abdomen is soft.   Musculoskeletal:         General: Normal range of motion.      Cervical back: Normal range of motion and neck supple.   Skin:     General: Skin is warm and dry.   Neurological:      Mental Status: He is alert and oriented to person, place, and time.         Procedures           ED Course  ED Course as of Mar 29 1428   Tue Mar 23, 2021   2240 The patient's troponin are negative. Remaining testing is unremarkable.  I discussed his case with Dr Erazo.  I offered him overnight admission for evaluation by cardiology in the am.  He declines admission.  He states he has been thinking and he believes this pain is related to the imdur he just started.  He states he wants to go home tonight and call cardiology tomorrow.  He voiced understanding of all results and d'c instructions.    [KS]      ED Course User Index  [KS] Shoulders, Kristee Croft, APRN                                         HEART Score (for prediction of 6-week risk of major adverse cardiac event) reviewed and/or performed as part of the patient evaluation and treatment planning process.  The result associated with this review/performance is: 3       MDM  Number of Diagnoses or Management Options  Angina at rest (CMS/HCC): established and worsening     Amount and/or Complexity of Data Reviewed  Clinical lab tests: ordered and reviewed  Tests in the radiology section of CPT®: ordered and reviewed  Discuss the patient with other providers: yes  Independent visualization of images, tracings, or specimens: yes    Risk of Complications, Morbidity, and/or Mortality  Presenting problems: low  Diagnostic procedures: low  Management options: low    Patient Progress  Patient progress:  stable      Final diagnoses:   Angina at rest (CMS/HCC)       ED Disposition  ED Disposition     ED Disposition Condition Comment    Discharge Stable           Jori Boucher MD  [address] [id number]  [phone]    Schedule an appointment as soon as possible for a visit in 1 day  Routine ED follow up         Medication List      No changes were made to your prescriptions during this visit.          Chris Sargent, APRN  03/29/21 [id number]

## 2021-03-24 NOTE — DISCHARGE INSTRUCTIONS
Drink plenty of fluid.  Continue home medication at this time.  Follow up with Dr Phan, cardiology, tomorrow.  Call for appointment. Follow up with Pcp - call for appointment. Return to ED if condition does not improve or worsens

## 2021-03-26 ENCOUNTER — OFFICE VISIT (OUTPATIENT)
Dept: UROLOGY | Facility: CLINIC | Age: 66
End: 2021-03-26

## 2021-03-26 VITALS — BODY MASS INDEX: 27.28 KG/M2 | HEIGHT: 69 IN | TEMPERATURE: 96.9 F | WEIGHT: 184.2 LBS

## 2021-03-26 DIAGNOSIS — N40.1 BPH WITH URINARY OBSTRUCTION: ICD-10-CM

## 2021-03-26 DIAGNOSIS — N43.41 SPERMATOCELE OF EPIDIDYMIS, SINGLE: ICD-10-CM

## 2021-03-26 DIAGNOSIS — N28.1 RENAL CYST: Primary | ICD-10-CM

## 2021-03-26 DIAGNOSIS — N13.8 BPH WITH URINARY OBSTRUCTION: ICD-10-CM

## 2021-03-26 PROCEDURE — 99204 OFFICE O/P NEW MOD 45 MIN: CPT | Performed by: UROLOGY

## 2021-04-01 ENCOUNTER — OFFICE VISIT (OUTPATIENT)
Dept: CARDIOLOGY | Facility: CLINIC | Age: 66
End: 2021-04-01

## 2021-04-01 VITALS
DIASTOLIC BLOOD PRESSURE: 70 MMHG | OXYGEN SATURATION: 100 % | SYSTOLIC BLOOD PRESSURE: 122 MMHG | BODY MASS INDEX: 27.11 KG/M2 | HEIGHT: 69 IN | WEIGHT: 183 LBS | HEART RATE: 78 BPM

## 2021-04-01 DIAGNOSIS — I20.8 ANGINA AT REST (HCC): Primary | ICD-10-CM

## 2021-04-01 DIAGNOSIS — E78.49 OTHER HYPERLIPIDEMIA: ICD-10-CM

## 2021-04-01 DIAGNOSIS — I10 ESSENTIAL HYPERTENSION: ICD-10-CM

## 2021-04-01 PROCEDURE — 99213 OFFICE O/P EST LOW 20 MIN: CPT | Performed by: EMERGENCY MEDICINE

## 2021-04-01 NOTE — PROGRESS NOTES
[facility] - CARDIOLOGY  New Patient Initial Outpatient Evaulation    Primary Care Physician: Wilbert Saleem MD    Subjective     Chief Complaint   Patient presents with   • Chest Pain     2 WK ER FU        History of Present Illness     65-year-old male is a 1 month follow-up today for chest pain.  History of hypertension and hyperlipidemia.  Abnormal stress test.    He says since his last visit, he has had a couple of episodes of the chest pain.  He says it is sharp in nature and feels like a muscle pull across his left chest which goes away after 1 to 2 seconds.  No associated shortness of breath, nausea or diaphoresis.    He was seen in the emergency department on the 23rd at which time cardiac work-up was normal.      Review of Systems   Constitutional: Negative for diaphoresis, fever and malaise/fatigue.   HENT: Negative for congestion.    Eyes: Negative for vision loss in left eye and vision loss in right eye.   Cardiovascular: Positive for chest pain. Negative for claudication, dyspnea on exertion, irregular heartbeat, leg swelling, orthopnea, palpitations and syncope.   Respiratory: Negative for cough, shortness of breath and wheezing.    Hematologic/Lymphatic: Negative for adenopathy.   Skin: Negative for rash.   Musculoskeletal: Negative for joint pain and joint swelling.   Gastrointestinal: Negative for abdominal pain, diarrhea, nausea and vomiting.   Neurological: Negative for excessive daytime sleepiness, dizziness, focal weakness, light-headedness, numbness and weakness.   Psychiatric/Behavioral: Negative for depression. The patient does not have insomnia.         Otherwise complete ROS reviewed and negative except as mentioned in the HPI.      Past Medical History:   Past Medical History:   Diagnosis Date   • Colon polyps    • Diverticulosis    • GERD (gastroesophageal reflux disease)    • Hyperlipidemia    • Hypertension    • IBS (irritable bowel syndrome)    • Knee pain        Past Surgical  History:  Past Surgical History:   Procedure Laterality Date   • COLONOSCOPY  2006    polyps present   • COLONOSCOPY  08/20/2010    clean based ulcerations covering ton lumen @ hepatic flexure, (5) polypectomies, moderate diverticulosis left colon   • COLONOSCOPY  11/12/2010    resolved colonic ulcerations, small polypectomies, moderate diverticulosis   • COLONOSCOPY  12/31/2013   • COLONOSCOPY N/A 12/16/2016    Procedure: COLONOSCOPY WITH ANESTHESIA;  Surgeon: Boris Castañeda MD;  Location:  [facility] ENDOSCOPY;  Service:    • ENDOSCOPY  2006    states hiatal hernia   • ENDOSCOPY N/A 12/16/2016    Procedure: ESOPHAGOGASTRODUODENOSCOPY WITH ANESTHESIA;  Surgeon: Boris Castañeda MD;  Location:  [facility] ENDOSCOPY;  Service:    • KNEE SURGERY         Family History: family history includes Colon cancer in his brother and father; Colon polyps in his sister; Heart disease in his mother.    Social History:  reports that he has been smoking cigarettes. He has been smoking about 1.00 pack per day. He has never used smokeless tobacco. He reports current alcohol use. He reports that he does not use drugs.    Medications:  Prior to Admission medications    Medication Sig Start Date End Date Taking? Authorizing Provider   aspirin 81 MG EC tablet Take 81 mg by mouth daily.   Yes Marsha Rosas MD   atorvastatin (LIPITOR) 40 MG tablet TK 1 T PO QD 1/16/17  Yes Marsha Rosas MD   carvedilol (COREG) 3.125 MG tablet Take 3.125 mg by mouth 2 (Two) Times a Day With Meals.   Yes Marsha Rosas MD   cetirizine (zyrTEC) 10 MG tablet Take 10 mg by mouth Daily.   Yes Marsha Rosas MD   Coenzyme Q10 (CO Q 10 PO) Take  by mouth.   Yes Marsha Rosas MD   Cyanocobalamin (VITAMIN B 12 PO) Take  by mouth.   Yes Marsha Rosas MD   hydroCHLOROthiazide (HYDRODIURIL) 25 MG tablet Take 25 mg by mouth Daily.   Yes Marsha Rosas MD   HYDROcodone-acetaminophen (NORCO) 7.5-325 MG per tablet  10/10/16   "Yes Marsha Rosas MD   Inulin (FIBER CHOICE PO) Take  by mouth Daily.   Yes Marsha Rosas MD   isosorbide mononitrate (IMDUR) 30 MG 24 hr tablet Take 1 tablet by mouth Daily. 3/16/21  Yes Finn Mustafa, DO   Lactobacillus (PROBIOTIC ACIDOPHILUS PO) Take  by mouth.   Yes Marsha Rosas MD   losartan (COZAAR) 100 MG tablet Take 100 mg by mouth Daily.   Yes Marsha Rosas MD   losartan-hydrochlorothiazide (HYZAAR) 100-25 MG per tablet TK 1 T PO QD 7/12/16  Yes Marsha Rosas MD   Multiple Vitamins-Minerals (CENTRUM SILVER PO) Take  by mouth.   Yes Marsha Rosas MD   pantoprazole (PROTONIX) 40 MG EC tablet TK 1 T PO Q MORNING 8/10/16  Yes Marsha Rosas MD   POTASSIUM CHLORIDE PO Take  by mouth.   Yes Marsha Rosas MD     Allergies:  Allergies   Allergen Reactions   • Other Other (See Comments)     Patient states he is allergic to some abx but cant name them       Objective     Vital Signs: /70   Pulse 78   Ht 175.3 cm (69.02\")   Wt 83 kg (183 lb)   SpO2 100%   BMI 27.01 kg/m²     Vitals and nursing note reviewed.   Constitutional:       Appearance: Normal and healthy appearance. Well-developed and not in distress.   Eyes:      Extraocular Movements: Extraocular movements intact.      Pupils: Pupils are equal, round, and reactive to light.   HENT:      Head: Normocephalic and atraumatic.    Mouth/Throat:      Pharynx: Oropharynx is clear.   Neck:      Vascular: JVD normal.      Trachea: Trachea normal.   Pulmonary:      Effort: Pulmonary effort is normal.      Breath sounds: Normal breath sounds. No wheezing. No rhonchi. No rales.   Cardiovascular:      PMI at left midclavicular line. Normal rate. Regular rhythm. Normal S1. Normal S2.      Murmurs: There is a grade 2/6 systolic murmur.      No gallop. No click. No rub.   Pulses:     Dorsalis pedis: 2+ bilaterally.     Posterior tibial: 2+ bilaterally.  Abdominal:      General: Bowel " sounds are normal.      Palpations: Abdomen is soft.      Tenderness: There is no abdominal tenderness.   Musculoskeletal: Normal range of motion.      Cervical back: Normal range of motion and neck supple. Skin:     General: Skin is warm and dry.      Capillary Refill: Capillary refill takes less than 2 seconds.   Feet:      Right foot:      Skin integrity: Skin integrity normal.      Left foot:      Skin integrity: Skin integrity normal.   Neurological:      Mental Status: Alert and oriented to person, place and time.      Cranial Nerves: Cranial nerves are intact.      Sensory: Sensation is intact.      Motor: Motor function is intact.      Coordination: Coordination is intact.   Psychiatric:         Speech: Speech normal.         Behavior: Behavior is cooperative.         Results Reviewed:          No results found for: CHOL, TRIG, HDL, VLDL, LDLHDL  No results found for: HGBA1C    Assessment / Plan        Problem List Items Addressed This Visit        Cardiac and Vasculature    Essential hypertension    Other hyperlipidemia    Angina at rest (CMS/HCC) - Primary          Plan:    65-year-old male with history of hypertension and hyperlipidemia with nicotine abuse and abnormal stress test follows up today for chest pain.  Chest pain remains atypical.  He was evaluated in the emergency department approximately 10 days ago at which time work-up was normal.    We will continue conservative management at this time.  He continues on aspirin 81, Lipitor 40, Coreg 3.125 twice daily, HCTZ 25, Imdur 30,  Hyzaar 100/25    I have instructed patient to call me personally if he has any more episodes of significant chest pain that worsen or if the episodes become more frequent or severe.  We will schedule the patient for a left heart catheterization at that time.    Follow-up in the clinic in 3 months otherwise    Finn Mustafa,    Interventional cardiology  [facility]  04/01/21   09:52 CDT

## 2021-07-01 ENCOUNTER — OFFICE VISIT (OUTPATIENT)
Dept: CARDIOLOGY | Facility: CLINIC | Age: 66
End: 2021-07-01

## 2021-07-01 VITALS
BODY MASS INDEX: 26.07 KG/M2 | WEIGHT: 176 LBS | HEIGHT: 69 IN | OXYGEN SATURATION: 98 % | DIASTOLIC BLOOD PRESSURE: 62 MMHG | SYSTOLIC BLOOD PRESSURE: 118 MMHG | HEART RATE: 74 BPM

## 2021-07-01 DIAGNOSIS — I20.8 ANGINA AT REST (HCC): ICD-10-CM

## 2021-07-01 DIAGNOSIS — R94.39 ABNORMAL STRESS TEST: Primary | ICD-10-CM

## 2021-07-01 DIAGNOSIS — I10 ESSENTIAL HYPERTENSION: ICD-10-CM

## 2021-07-01 DIAGNOSIS — R06.09 DYSPNEA ON EXERTION: ICD-10-CM

## 2021-07-01 DIAGNOSIS — I20.0 UNSTABLE ANGINA (HCC): ICD-10-CM

## 2021-07-01 DIAGNOSIS — E78.49 OTHER HYPERLIPIDEMIA: ICD-10-CM

## 2021-07-01 PROCEDURE — 93000 ELECTROCARDIOGRAM COMPLETE: CPT | Performed by: EMERGENCY MEDICINE

## 2021-07-01 PROCEDURE — 99213 OFFICE O/P EST LOW 20 MIN: CPT | Performed by: EMERGENCY MEDICINE

## 2021-07-01 NOTE — PROGRESS NOTES
Subjective:     Encounter Date:07/01/2021      Patient ID: Dereje Salazar is a 66 y.o. male.    Chief Complaint:  History of Present Illness    Patient is a 3-month follow-up today.  Is a history of hypertension, hyperlipidemia and nicotine abuse who I first saw in March of this year.  He underwent a nuclear stress test which showed regions of ischemia in the inferior and inferior lateral walls.  He also had an echocardiogram performed which showed an enlarged left atrium and right ventricle with evidence of LVH.  Normal systolic and valve function.  At the first appointment we discussed proceeding with left heart catheterization versus optimizing his antianginal regimen.  He opted for the Imdur therapy.    He says that he believes the Imdur has helped with his episodes of chest pain.  He only occasionally gets the chest pain now instead of her frequently as before.  He also says that in the morning hours whenever he bends down he does get some dizziness episodes.  His shortness of breath on exertion has not changed much.  He is scheduled to undergo cataract surgery in 1 week on July 8.    Review of Systems   Constitutional: Negative for diaphoresis, fever and malaise/fatigue.   HENT: Negative for congestion.    Eyes: Negative for vision loss in left eye and vision loss in right eye.   Cardiovascular: Positive for chest pain. Negative for claudication, dyspnea on exertion, irregular heartbeat, leg swelling, orthopnea, palpitations and syncope.   Respiratory: Positive for shortness of breath. Negative for cough and wheezing.    Hematologic/Lymphatic: Negative for adenopathy.   Skin: Negative for rash.   Musculoskeletal: Negative for joint pain and joint swelling.   Gastrointestinal: Negative for abdominal pain, diarrhea, nausea and vomiting.   Neurological: Positive for dizziness. Negative for excessive daytime sleepiness, focal weakness, light-headedness, numbness and weakness.   Psychiatric/Behavioral: Negative  for depression. The patient does not have insomnia.            Current Outpatient Medications:   •  aspirin 81 MG EC tablet, Take 81 mg by mouth daily., Disp: , Rfl:   •  atorvastatin (LIPITOR) 40 MG tablet, TK 1 T PO QD, Disp: , Rfl: 0  •  carvedilol (COREG) 3.125 MG tablet, Take 3.125 mg by mouth 2 (Two) Times a Day With Meals., Disp: , Rfl:   •  cetirizine (zyrTEC) 10 MG tablet, Take 10 mg by mouth Daily., Disp: , Rfl:   •  Coenzyme Q10 (CO Q 10 PO), Take  by mouth., Disp: , Rfl:   •  Cyanocobalamin (VITAMIN B 12 PO), Take  by mouth., Disp: , Rfl:   •  hydroCHLOROthiazide (HYDRODIURIL) 25 MG tablet, Take 25 mg by mouth Daily., Disp: , Rfl:   •  HYDROcodone-acetaminophen (NORCO) 7.5-325 MG per tablet, , Disp: , Rfl:   •  Inulin (FIBER CHOICE PO), Take  by mouth Daily., Disp: , Rfl:   •  isosorbide mononitrate (IMDUR) 30 MG 24 hr tablet, Take 1 tablet by mouth Daily., Disp: 90 tablet, Rfl: 3  •  Lactobacillus (PROBIOTIC ACIDOPHILUS PO), Take  by mouth., Disp: , Rfl:   •  losartan (COZAAR) 100 MG tablet, Take 100 mg by mouth Daily., Disp: , Rfl:   •  Multiple Vitamins-Minerals (CENTRUM SILVER PO), Take  by mouth., Disp: , Rfl:   •  pantoprazole (PROTONIX) 40 MG EC tablet, TK 1 T PO Q MORNING, Disp: , Rfl: 3  •  POTASSIUM CHLORIDE PO, Take  by mouth., Disp: , Rfl:        Objective:      Vitals:    07/01/21 0831   BP: 118/62   Pulse: 74   SpO2: 98%     Vitals and nursing note reviewed.   Constitutional:       Appearance: Normal and healthy appearance. Well-developed and not in distress.   Eyes:      Extraocular Movements: Extraocular movements intact.      Pupils: Pupils are equal, round, and reactive to light.   HENT:      Head: Normocephalic and atraumatic.    Mouth/Throat:      Pharynx: Oropharynx is clear.   Neck:      Vascular: JVD normal.      Trachea: Trachea normal.   Pulmonary:      Effort: Pulmonary effort is normal.      Breath sounds: Normal breath sounds. No wheezing. No rhonchi. No rales.    Cardiovascular:      PMI at left midclavicular line. Normal rate. Regular rhythm. Normal S1. Normal S2.      Murmurs: There is no murmur.      No gallop. No click. No rub.   Pulses:     Dorsalis pedis: 2+ bilaterally.     Posterior tibial: 2+ bilaterally.  Abdominal:      General: Bowel sounds are normal.      Palpations: Abdomen is soft.      Tenderness: There is no abdominal tenderness.   Musculoskeletal: Normal range of motion.      Cervical back: Normal range of motion and neck supple. Skin:     General: Skin is warm and dry.      Capillary Refill: Capillary refill takes less than 2 seconds.   Feet:      Right foot:      Skin integrity: Skin integrity normal.      Left foot:      Skin integrity: Skin integrity normal.   Neurological:      Mental Status: Alert and oriented to person, place and time.      Cranial Nerves: Cranial nerves are intact.      Sensory: Sensation is intact.      Motor: Motor function is intact.      Coordination: Coordination is intact.   Psychiatric:         Speech: Speech normal.         Behavior: Behavior is cooperative.         Lab Review:         ECG 12 Lead    Date/Time: 7/1/2021 5:33 PM  Performed by: Finn Mustafa DO  Authorized by: Finn Mustafa DO   Comparison: compared with previous ECG   Similar to previous ECG  Rhythm: sinus rhythm  Rate: normal  Conduction: 1st degree AV block  ST Segments: ST segments normal  T Waves: T waves normal  QRS axis: normal  Other: no other findings    Clinical impression: abnormal EKG              Assessment/Plan:     Problem List Items Addressed This Visit        Cardiac and Vasculature    Essential hypertension    Other hyperlipidemia    Abnormal stress test - Primary    Angina at rest (CMS/HCC)    Dyspnea on exertion            Recommendations/plans:      We will schedule the patient for a diagnostic left heart cath via the right radial approach after he undergoes his cataract surgery later this month.  Tentative  plans will be either on the 29th or 30 July to assess his coronary anatomy due to the abnormal stress test and continuation of his symptoms including chest pain and dyspnea on exertion despite being on multiple antianginal agents including beta-blocker and long-acting nitrate.    Finn Mustafa, DO   Interventional cardiology  [facility]  07/01/2021  17:33 CDT

## 2021-07-02 PROBLEM — I20.0 UNSTABLE ANGINA (HCC): Status: ACTIVE | Noted: 2021-07-02

## 2021-07-06 ENCOUNTER — TELEPHONE (OUTPATIENT)
Dept: CARDIOLOGY | Facility: CLINIC | Age: 66
End: 2021-07-06

## 2021-07-15 ENCOUNTER — HOSPITAL ENCOUNTER (OUTPATIENT)
Facility: HOSPITAL | Age: 66
Setting detail: HOSPITAL OUTPATIENT SURGERY
Discharge: HOME | End: 2021-07-15
Attending: EMERGENCY MEDICINE | Admitting: EMERGENCY MEDICINE

## 2021-07-15 VITALS
DIASTOLIC BLOOD PRESSURE: 69 MMHG | SYSTOLIC BLOOD PRESSURE: 114 MMHG | HEART RATE: 56 BPM | HEIGHT: 69 IN | RESPIRATION RATE: 20 BRPM | BODY MASS INDEX: 26.63 KG/M2 | WEIGHT: 179.8 LBS | OXYGEN SATURATION: 100 % | TEMPERATURE: 96.2 F

## 2021-07-15 DIAGNOSIS — I20.0 UNSTABLE ANGINA (HCC): ICD-10-CM

## 2021-07-15 DIAGNOSIS — I20.8 ANGINA AT REST (HCC): ICD-10-CM

## 2021-07-15 DIAGNOSIS — R94.39 ABNORMAL STRESS TEST: ICD-10-CM

## 2021-07-15 LAB
ANION GAP SERPL CALCULATED.3IONS-SCNC: 8 MMOL/L (ref 5–15)
BASOPHILS # BLD AUTO: 0.03 10*3/MM3 (ref 0–0.2)
BASOPHILS NFR BLD AUTO: 0.4 % (ref 0–1.5)
BUN SERPL-MCNC: 17 MG/DL (ref 8–23)
BUN/CREAT SERPL: 23.6 (ref 7–25)
CALCIUM SPEC-SCNC: 9.6 MG/DL (ref 8.6–10.5)
CHLORIDE SERPL-SCNC: 100 MMOL/L (ref 98–107)
CO2 SERPL-SCNC: 28 MMOL/L (ref 22–29)
CREAT SERPL-MCNC: 0.72 MG/DL (ref 0.76–1.27)
DEPRECATED RDW RBC AUTO: 44.5 FL (ref 37–54)
EOSINOPHIL # BLD AUTO: 0.11 10*3/MM3 (ref 0–0.4)
EOSINOPHIL NFR BLD AUTO: 1.5 % (ref 0.3–6.2)
ERYTHROCYTE [DISTWIDTH] IN BLOOD BY AUTOMATED COUNT: 13.2 % (ref 12.3–15.4)
GFR SERPL CREATININE-BSD FRML MDRD: 109 ML/MIN/1.73
GLUCOSE SERPL-MCNC: 119 MG/DL (ref 65–99)
HCT VFR BLD AUTO: 36.9 % (ref 37.5–51)
HGB BLD-MCNC: 12.8 G/DL (ref 13–17.7)
IMM GRANULOCYTES # BLD AUTO: 0.02 10*3/MM3 (ref 0–0.05)
IMM GRANULOCYTES NFR BLD AUTO: 0.3 % (ref 0–0.5)
LYMPHOCYTES # BLD AUTO: 1.1 10*3/MM3 (ref 0.7–3.1)
LYMPHOCYTES NFR BLD AUTO: 15.3 % (ref 19.6–45.3)
MCH RBC QN AUTO: 31.9 PG (ref 26.6–33)
MCHC RBC AUTO-ENTMCNC: 34.7 G/DL (ref 31.5–35.7)
MCV RBC AUTO: 92 FL (ref 79–97)
MONOCYTES # BLD AUTO: 0.86 10*3/MM3 (ref 0.1–0.9)
MONOCYTES NFR BLD AUTO: 12 % (ref 5–12)
NEUTROPHILS NFR BLD AUTO: 5.05 10*3/MM3 (ref 1.7–7)
NEUTROPHILS NFR BLD AUTO: 70.5 % (ref 42.7–76)
NRBC BLD AUTO-RTO: 0 /100 WBC (ref 0–0.2)
PLATELET # BLD AUTO: 217 10*3/MM3 (ref 140–450)
PMV BLD AUTO: 9.4 FL (ref 6–12)
POTASSIUM SERPL-SCNC: 4 MMOL/L (ref 3.5–5.2)
RBC # BLD AUTO: 4.01 10*6/MM3 (ref 4.14–5.8)
SODIUM SERPL-SCNC: 136 MMOL/L (ref 136–145)
WBC # BLD AUTO: 7.17 10*3/MM3 (ref 3.4–10.8)

## 2021-07-15 PROCEDURE — C1894 INTRO/SHEATH, NON-LASER: HCPCS | Performed by: EMERGENCY MEDICINE

## 2021-07-15 PROCEDURE — 99152 MOD SED SAME PHYS/QHP 5/>YRS: CPT | Performed by: EMERGENCY MEDICINE

## 2021-07-15 PROCEDURE — 85025 COMPLETE CBC W/AUTO DIFF WBC: CPT | Performed by: EMERGENCY MEDICINE

## 2021-07-15 PROCEDURE — 25010000002 MIDAZOLAM HCL (PF) 5 MG/5ML SOLUTION: Performed by: EMERGENCY MEDICINE

## 2021-07-15 PROCEDURE — 25010000002 DIPHENHYDRAMINE PER 50 MG: Performed by: EMERGENCY MEDICINE

## 2021-07-15 PROCEDURE — 25010000002 HEPARIN (PORCINE) 1000-0.9 UT/500ML-% SOLUTION: Performed by: EMERGENCY MEDICINE

## 2021-07-15 PROCEDURE — 0 IOPAMIDOL PER 1 ML: Performed by: EMERGENCY MEDICINE

## 2021-07-15 PROCEDURE — 25010000002 HEPARIN (PORCINE) 2000-0.9 UNIT/L-% SOLUTION: Performed by: EMERGENCY MEDICINE

## 2021-07-15 PROCEDURE — 93458 L HRT ARTERY/VENTRICLE ANGIO: CPT | Performed by: EMERGENCY MEDICINE

## 2021-07-15 PROCEDURE — 25010000002 FENTANYL CITRATE (PF) 100 MCG/2ML SOLUTION: Performed by: EMERGENCY MEDICINE

## 2021-07-15 PROCEDURE — 99153 MOD SED SAME PHYS/QHP EA: CPT | Performed by: EMERGENCY MEDICINE

## 2021-07-15 PROCEDURE — 25010000002 HEPARIN (PORCINE) PER 1000 UNITS: Performed by: EMERGENCY MEDICINE

## 2021-07-15 PROCEDURE — C1769 GUIDE WIRE: HCPCS | Performed by: EMERGENCY MEDICINE

## 2021-07-15 PROCEDURE — 80048 BASIC METABOLIC PNL TOTAL CA: CPT | Performed by: EMERGENCY MEDICINE

## 2021-07-15 RX ORDER — DILTIAZEM HYDROCHLORIDE 120 MG/1
120 CAPSULE, COATED, EXTENDED RELEASE ORAL DAILY
Qty: 90 CAPSULE | Refills: 3 | Status: SHIPPED | OUTPATIENT
Start: 2021-07-15 | End: 2021-09-20

## 2021-07-15 RX ORDER — SODIUM CHLORIDE 0.9 % (FLUSH) 0.9 %
10 SYRINGE (ML) INJECTION EVERY 12 HOURS SCHEDULED
Status: DISCONTINUED | OUTPATIENT
Start: 2021-07-15 | End: 2021-07-15 | Stop reason: HOSPADM

## 2021-07-15 RX ORDER — HEPARIN SODIUM 200 [USP'U]/100ML
INJECTION, SOLUTION INTRAVENOUS AS NEEDED
Status: DISCONTINUED | OUTPATIENT
Start: 2021-07-15 | End: 2021-07-15 | Stop reason: HOSPADM

## 2021-07-15 RX ORDER — SODIUM CHLORIDE 0.9 % (FLUSH) 0.9 %
10 SYRINGE (ML) INJECTION AS NEEDED
Status: DISCONTINUED | OUTPATIENT
Start: 2021-07-15 | End: 2021-07-15 | Stop reason: HOSPADM

## 2021-07-15 RX ORDER — FENTANYL CITRATE 50 UG/ML
INJECTION, SOLUTION INTRAMUSCULAR; INTRAVENOUS AS NEEDED
Status: DISCONTINUED | OUTPATIENT
Start: 2021-07-15 | End: 2021-07-15 | Stop reason: HOSPADM

## 2021-07-15 RX ORDER — MIDAZOLAM HYDROCHLORIDE 1 MG/ML
INJECTION, SOLUTION INTRAMUSCULAR; INTRAVENOUS AS NEEDED
Status: DISCONTINUED | OUTPATIENT
Start: 2021-07-15 | End: 2021-07-15 | Stop reason: HOSPADM

## 2021-07-15 RX ORDER — SODIUM CHLORIDE 9 MG/ML
75 INJECTION, SOLUTION INTRAVENOUS CONTINUOUS
Status: DISCONTINUED | OUTPATIENT
Start: 2021-07-15 | End: 2021-07-15 | Stop reason: HOSPADM

## 2021-07-15 RX ORDER — DIPHENHYDRAMINE HYDROCHLORIDE 50 MG/ML
INJECTION INTRAMUSCULAR; INTRAVENOUS AS NEEDED
Status: DISCONTINUED | OUTPATIENT
Start: 2021-07-15 | End: 2021-07-15 | Stop reason: HOSPADM

## 2021-07-15 RX ORDER — LIDOCAINE HYDROCHLORIDE 20 MG/ML
INJECTION, SOLUTION INFILTRATION; PERINEURAL AS NEEDED
Status: DISCONTINUED | OUTPATIENT
Start: 2021-07-15 | End: 2021-07-15 | Stop reason: HOSPADM

## 2021-07-15 NOTE — OP NOTE
Date of Procedure: July 15, 2021     Procedures Performed:     1.  Access to the right ulnar artery via modified Seldinger technique and ultrasound guidance  2.  Left heart catheterization with retrograde crossing the aortic valve into the left ventricle  3.  LV ventriculography  4.  Selective bilateral coronary angiography  5.  Attempted access to the right radial artery via modified Seldinger technique and ultrasound guidance.  Aborted due to inability to pass equipment.  6.  Conscious sedation with continuous hemodynamic monitoring for 20 minutes  7.  Patent hemostasis achieved to the right radial artery access site using manual pressure held to the right wrist for 5 minutes  8.  Patent hemostasis achieved to the right ulnar artery access site using a TR band applied to the right wrist once the sheath was pulled.        Risk, Benefits, and Alternatives discussed with the patient and/or family.  Plan is for moderate sedation, and the patient agrees to proceed with the procedure.  An immediate assessment was done prior to the administration of moderate sedation.     Indication: Persistent typical angina on multiple antianginal regimen, abnormal stress test    Access: Right ulnar  Diagnostic Catheters: TIG, pigtail     Procedural Details: After written and informed consent was obtained, the patient was brought to the cardiac catheterization lab in a fasting state. The skin overlying the patient's right wrist was prepped and draped in the usual sterile fashion. Timeout was taken to confirm the correct patient and procedure. IV Versed and fentanyl were used to achieve conscious sedation. Lidocaine was administered for local anesthesia over the right radial artery.  Attempted to obtain access in the right radial artery but this was aborted due to inability to pass equipment effectively.  Patent hemostasis was achieved in the right radial artery access site using manual pressure held to the right wrist 45 minutes.   Next, access was obtained in the right ulnar artery via modified Seldinger technique and ultrasound guidance.  A 6 Wolof sheath was placed into the artery and flushed.  Next a TIG catheter was inserted and advanced and used to engage the left main coronary artery.  Selective left coronary angiography was performed multiple views.  This catheter was then used to engage the right coronary artery.  Selective right coronary angiography was performed in multiple views.  This catheter was then exchanged for a pigtail catheter which was used to cross the aortic valve into the left ventricle where pressures were recorded.  LV ventriculography was then performed in the MCDANIEL projection.  This cath was drawn back across the aortic valve into the ascending aorta and again pressures were recorded.  Everything was withdrawn to the sheath and the sheath was flushed.  Patient tolerated the procedure well without any complications.  Patent hemostasis was achieved in the right ulnar access site using a TR band applied to the right wrist once the sheath was pulled.  The patient tolerated the procedure well without any complications.  He left the Cath Lab in stable condition and will return to post cardiac cath recovery area for close monitoring before discharge home.    During the procedure, I supervised the administration and monitoring of conscious sedation.  This included monitoring of the patient's overall status, hemodynamics and oximetry.  The patient received the first dose of IV sedation at 0829 and I left the room at 0850, accounting for a total of 20 minutes of face-to-face encounter time with the patient in the cath lab today.     Results:  Hemodynamics:  Aorta 80/60 mmHg  LV: 94/13 mmHg  LVEDP: 17 mmHg     Selective Coronary Angiography:    Left Main Coronary Artery: Left main is a very large, short caliber vessel that bifurcates into the LAD and left circumflex arteries, no angiographic evidence of stenosis, SHANTEL-3  flow  LAD: The LAD is a very large caliber vessel that has no angiographic evidence of stenosis.  There is significant myocardial bridging noted in the mid segment, SHANTEL-3 flow  Diagonals: Moderate caliber vessels with no significant disease  Left circumflex: Left circumflex is a large-caliber vessel that has no angiographic evidence of stenosis, SHANTEL-3 flow  Obtuse marginals: First OM is a moderate caliber vessel with no significant disease, the second OM is a large caliber vessel with no significant disease  RCA: Right coronary artery is a very large caliber vessel with no angiographic evidence of stenosis, SHANTEL-3 flow  PDA/MILAD: The MILAD is a large-caliber vessel with no significant disease, the PDA is a moderate caliber vessel with no significant disease and is very tortuous         Total contrast: 73mL     Fluoroscopy time: 2 minutes      X-ray exposure: 192 mGy     Estimated Blood Loss: 5 cc    Specimens: None    Complications: None    Impression:  1.  No significant epicardial coronary artery disease as described above  2.  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  Hypertension  4.  Hyperlipidemia  5.  Nicotine abuse     Plan:  1.  Monitor post cardiac cath recovery with plans for discharge home later today  2.  His current cardiac medications include aspirin 81, Lipitor 40, Coreg 3.125 twice daily, HCTZ 25, Imdur 30.  I am going to add a calcium channel blocker to his regimen for the myocardial bridging to hopefully fully alleviate him of any symptoms  3.  Follow-up with cardiology in 3 months          Finn Mustafa DO  Interventional cardiology  July 15, 2021

## 2021-07-15 NOTE — H&P
Patient seen and examined at bedside.  The history and physical as below has not changed in the interim.      We will proceed with diagnostic left heart catheterization via right radial access with possible intervention based on findings.  The stress test showed areas of ischemia in the inferior and inferior lateral walls.    The risk, alternatives and benefits were explained to the patient and he wishes to proceed.    Chief Complaint:  History of Present Illness     Patient is a 3-month follow-up today.  Is a history of hypertension, hyperlipidemia and nicotine abuse who I first saw in March of this year.  He underwent a nuclear stress test which showed regions of ischemia in the inferior and inferior lateral walls.  He also had an echocardiogram performed which showed an enlarged left atrium and right ventricle with evidence of LVH.  Normal systolic and valve function.  At the first appointment we discussed proceeding with left heart catheterization versus optimizing his antianginal regimen.  He opted for the Imdur therapy.     He says that he believes the Imdur has helped with his episodes of chest pain.  He only occasionally gets the chest pain now instead of her frequently as before.  He also says that in the morning hours whenever he bends down he does get some dizziness episodes.  His shortness of breath on exertion has not changed much.  He is scheduled to undergo cataract surgery in 1 week on July 8.     Review of Systems   Constitutional: Negative for diaphoresis, fever and malaise/fatigue.   HENT: Negative for congestion.    Eyes: Negative for vision loss in left eye and vision loss in right eye.   Cardiovascular: Positive for chest pain. Negative for claudication, dyspnea on exertion, irregular heartbeat, leg swelling, orthopnea, palpitations and syncope.   Respiratory: Positive for shortness of breath. Negative for cough and wheezing.    Hematologic/Lymphatic: Negative for adenopathy.   Skin: Negative  for rash.   Musculoskeletal: Negative for joint pain and joint swelling.   Gastrointestinal: Negative for abdominal pain, diarrhea, nausea and vomiting.   Neurological: Positive for dizziness. Negative for excessive daytime sleepiness, focal weakness, light-headedness, numbness and weakness.   Psychiatric/Behavioral: Negative for depression. The patient does not have insomnia.                Current Outpatient Medications:   •  aspirin 81 MG EC tablet, Take 81 mg by mouth daily., Disp: , Rfl:   •  atorvastatin (LIPITOR) 40 MG tablet, TK 1 T PO QD, Disp: , Rfl: 0  •  carvedilol (COREG) 3.125 MG tablet, Take 3.125 mg by mouth 2 (Two) Times a Day With Meals., Disp: , Rfl:   •  cetirizine (zyrTEC) 10 MG tablet, Take 10 mg by mouth Daily., Disp: , Rfl:   •  Coenzyme Q10 (CO Q 10 PO), Take  by mouth., Disp: , Rfl:   •  Cyanocobalamin (VITAMIN B 12 PO), Take  by mouth., Disp: , Rfl:   •  hydroCHLOROthiazide (HYDRODIURIL) 25 MG tablet, Take 25 mg by mouth Daily., Disp: , Rfl:   •  HYDROcodone-acetaminophen (NORCO) 7.5-325 MG per tablet, , Disp: , Rfl:   •  Inulin (FIBER CHOICE PO), Take  by mouth Daily., Disp: , Rfl:   •  isosorbide mononitrate (IMDUR) 30 MG 24 hr tablet, Take 1 tablet by mouth Daily., Disp: 90 tablet, Rfl: 3  •  Lactobacillus (PROBIOTIC ACIDOPHILUS PO), Take  by mouth., Disp: , Rfl:   •  losartan (COZAAR) 100 MG tablet, Take 100 mg by mouth Daily., Disp: , Rfl:   •  Multiple Vitamins-Minerals (CENTRUM SILVER PO), Take  by mouth., Disp: , Rfl:   •  pantoprazole (PROTONIX) 40 MG EC tablet, TK 1 T PO Q MORNING, Disp: , Rfl: 3  •  POTASSIUM CHLORIDE PO, Take  by mouth., Disp: , Rfl:            Objective:      Objective          Vitals:     07/01/21 0831   BP: 118/62   Pulse: 74   SpO2: 98%      Vitals and nursing note reviewed.   Constitutional:       Appearance: Normal and healthy appearance. Well-developed and not in distress.   Eyes:      Extraocular Movements: Extraocular movements intact.      Pupils:  Pupils are equal, round, and reactive to light.   HENT:      Head: Normocephalic and atraumatic.    Mouth/Throat:      Pharynx: Oropharynx is clear.   Neck:      Vascular: JVD normal.      Trachea: Trachea normal.   Pulmonary:      Effort: Pulmonary effort is normal.      Breath sounds: Normal breath sounds. No wheezing. No rhonchi. No rales.   Cardiovascular:      PMI at left midclavicular line. Normal rate. Regular rhythm. Normal S1. Normal S2.      Murmurs: There is no murmur.      No gallop. No click. No rub.   Pulses:     Dorsalis pedis: 2+ bilaterally.     Posterior tibial: 2+ bilaterally.  Abdominal:      General: Bowel sounds are normal.      Palpations: Abdomen is soft.      Tenderness: There is no abdominal tenderness.   Musculoskeletal: Normal range of motion.      Cervical back: Normal range of motion and neck supple. Skin:     General: Skin is warm and dry.      Capillary Refill: Capillary refill takes less than 2 seconds.   Feet:      Right foot:      Skin integrity: Skin integrity normal.      Left foot:      Skin integrity: Skin integrity normal.   Neurological:      Mental Status: Alert and oriented to person, place and time.      Cranial Nerves: Cranial nerves are intact.      Sensory: Sensation is intact.      Motor: Motor function is intact.      Coordination: Coordination is intact.   Psychiatric:         Speech: Speech normal.         Behavior: Behavior is cooperative.            Lab Review:            ECG 12 Lead     Date/Time: 7/1/2021 5:33 PM  Performed by: Finn Mustafa DO  Authorized by: Finn Mustafa DO   Comparison: compared with previous ECG   Similar to previous ECG  Rhythm: sinus rhythm  Rate: normal  Conduction: 1st degree AV block  ST Segments: ST segments normal  T Waves: T waves normal  QRS axis: normal  Other: no other findings    Clinical impression: abnormal EKG                  Assessment/Plan:           Problem List Items Addressed This Visit                  Cardiac and Vasculature      Essential hypertension      Other hyperlipidemia      Abnormal stress test - Primary      Angina at rest (CMS/HCC)      Dyspnea on exertion                 Recommendations/plans:        We will schedule the patient for a diagnostic left heart cath via the right radial approach after he undergoes his cataract surgery later this month.  Tentative plans will be either on the 29th or 30 July to assess his coronary anatomy due to the abnormal stress test and continuation of his symptoms including chest pain and dyspnea on exertion despite being on multiple antianginal agents including beta-blocker and long-acting nitrate.

## 2021-07-19 ENCOUNTER — TELEPHONE (OUTPATIENT)
Dept: CARDIOLOGY | Facility: CLINIC | Age: 66
End: 2021-07-19

## 2021-07-19 NOTE — TELEPHONE ENCOUNTER
PT NOTES SINCE HE HAS BEEN TAKING THE CARDIZEM HIS EYES ARE BURNING, HEADACHE IN THE EVENS AND THRUSH ON HIS TONGUE     PLEASE ADVISE

## 2021-07-21 NOTE — TELEPHONE ENCOUNTER
PT STATES HE THINKS THE MED IS WORKING.  HE THINKS THE SYMPTOMS HE WAS HAVING WAS ALLERGY RELATED PROBLEMS.  HE WANTS TO STAY ON THE MEDS FOR NOW.  APT MADE FOR 08/02/21.  WILL CALL BACK IF HE HAS ANY PROBLEMS BEFORE THEN.

## 2021-07-23 ENCOUNTER — TELEPHONE (OUTPATIENT)
Dept: CARDIOLOGY | Facility: CLINIC | Age: 66
End: 2021-07-23

## 2021-07-23 ENCOUNTER — OFFICE VISIT (OUTPATIENT)
Dept: CARDIOLOGY | Facility: CLINIC | Age: 66
End: 2021-07-23

## 2021-07-23 VITALS
DIASTOLIC BLOOD PRESSURE: 64 MMHG | OXYGEN SATURATION: 100 % | SYSTOLIC BLOOD PRESSURE: 130 MMHG | WEIGHT: 179 LBS | HEART RATE: 71 BPM | HEIGHT: 69 IN | BODY MASS INDEX: 26.51 KG/M2

## 2021-07-23 DIAGNOSIS — R94.39 ABNORMAL STRESS TEST: ICD-10-CM

## 2021-07-23 DIAGNOSIS — I10 ESSENTIAL HYPERTENSION: Primary | ICD-10-CM

## 2021-07-23 DIAGNOSIS — R06.09 DYSPNEA ON EXERTION: ICD-10-CM

## 2021-07-23 DIAGNOSIS — E78.49 OTHER HYPERLIPIDEMIA: ICD-10-CM

## 2021-07-23 DIAGNOSIS — I20.0 UNSTABLE ANGINA (HCC): ICD-10-CM

## 2021-07-23 PROCEDURE — 99213 OFFICE O/P EST LOW 20 MIN: CPT | Performed by: EMERGENCY MEDICINE

## 2021-07-24 NOTE — PROGRESS NOTES
Subjective:     Encounter Date:07/23/2021      Patient ID: Dereje Salazar is a 66 y.o. male.    Chief Complaint:  History of Present Illness    Patient is a 66-year-old male who follows up today.  He has a history of hypertension, hyperlipidemia and nicotine abuse.  He had an abnormal nuclear stress test with areas of ischemia.  He was started on antianginal regimen.  This worked for a while but his chest pain continue to worsen despite the addition of further antianginal agents.  He underwent a left heart catheterization which showed significant myocardial bridging in the mid LAD.  The remainder of his coronary arteries had mild disease only.  He was started on a calcium channel blocker in addition to his beta-blocker and long-acting nitrate.    Today, patient states he has been feeling much better with the addition of the Cardizem 120 mg.  There was some concern about adverse effect to the medication but this was found to be due to his allergies.  He wishes to continue on this regimen.    Review of Systems   Constitutional: Negative for diaphoresis, fever and malaise/fatigue.   HENT: Negative for congestion.    Eyes: Negative for vision loss in left eye and vision loss in right eye.   Cardiovascular: Negative for chest pain, claudication, dyspnea on exertion, irregular heartbeat, leg swelling, orthopnea, palpitations and syncope.   Respiratory: Negative for cough, shortness of breath and wheezing.    Hematologic/Lymphatic: Negative for adenopathy.   Skin: Negative for rash.   Musculoskeletal: Negative for joint pain and joint swelling.   Gastrointestinal: Negative for abdominal pain, diarrhea, nausea and vomiting.   Neurological: Negative for excessive daytime sleepiness, dizziness, focal weakness, light-headedness, numbness and weakness.   Psychiatric/Behavioral: Negative for depression. The patient does not have insomnia.            Current Outpatient Medications:   •  aspirin 81 MG EC tablet, Take 81 mg by  mouth daily., Disp: , Rfl:   •  atorvastatin (LIPITOR) 40 MG tablet, TK 1 T PO QD, Disp: , Rfl: 0  •  carvedilol (COREG) 3.125 MG tablet, Take 3.125 mg by mouth 2 (Two) Times a Day With Meals., Disp: , Rfl:   •  cetirizine (zyrTEC) 10 MG tablet, Take 10 mg by mouth Daily., Disp: , Rfl:   •  Coenzyme Q10 (CO Q 10 PO), Take  by mouth., Disp: , Rfl:   •  Cyanocobalamin (VITAMIN B 12 PO), Take  by mouth., Disp: , Rfl:   •  dilTIAZem CD (CARDIZEM CD) 120 MG 24 hr capsule, Take 1 capsule by mouth Daily., Disp: 90 capsule, Rfl: 3  •  hydroCHLOROthiazide (HYDRODIURIL) 25 MG tablet, Take 25 mg by mouth Daily., Disp: , Rfl:   •  HYDROcodone-acetaminophen (NORCO) 7.5-325 MG per tablet, , Disp: , Rfl:   •  Inulin (FIBER CHOICE PO), Take  by mouth Daily., Disp: , Rfl:   •  Multiple Vitamins-Minerals (CENTRUM SILVER PO), Take  by mouth., Disp: , Rfl:   •  nystatin (MYCOSTATIN) 527473 UNIT/ML suspension, , Disp: , Rfl:   •  pantoprazole (PROTONIX) 40 MG EC tablet, TK 1 T PO Q MORNING, Disp: , Rfl: 3  •  POTASSIUM CHLORIDE PO, Take  by mouth., Disp: , Rfl:        Objective:      Vitals:    07/23/21 0911   BP: 130/64   Pulse: 71   SpO2: 100%     Vitals and nursing note reviewed.   Constitutional:       Appearance: Normal and healthy appearance. Well-developed and not in distress.   Eyes:      Extraocular Movements: Extraocular movements intact.      Pupils: Pupils are equal, round, and reactive to light.   HENT:      Head: Normocephalic and atraumatic.    Mouth/Throat:      Pharynx: Oropharynx is clear.   Neck:      Vascular: JVD normal.      Trachea: Trachea normal.   Pulmonary:      Effort: Pulmonary effort is normal.      Breath sounds: Normal breath sounds. No wheezing. No rhonchi. No rales.   Cardiovascular:      PMI at left midclavicular line. Normal rate. Regular rhythm. Normal S1. Normal S2.      Murmurs: There is a grade 2/6 systolic murmur.      No gallop. No click. No rub.   Pulses:     Dorsalis pedis: 2+ bilaterally.      Posterior tibial: 2+ bilaterally.  Abdominal:      General: Bowel sounds are normal.      Palpations: Abdomen is soft.      Tenderness: There is no abdominal tenderness.   Musculoskeletal: Normal range of motion.      Cervical back: Normal range of motion and neck supple. Skin:     General: Skin is warm and dry.      Capillary Refill: Capillary refill takes less than 2 seconds.   Feet:      Right foot:      Skin integrity: Skin integrity normal.      Left foot:      Skin integrity: Skin integrity normal.   Neurological:      Mental Status: Alert and oriented to person, place and time.      Cranial Nerves: Cranial nerves are intact.      Sensory: Sensation is intact.      Motor: Motor function is intact.      Coordination: Coordination is intact.   Psychiatric:         Speech: Speech normal.         Behavior: Behavior is cooperative.         Lab Review:             Assessment/Plan:     Problem List Items Addressed This Visit        Cardiac and Vasculature    Essential hypertension - Primary    Other hyperlipidemia    Abnormal stress test    Dyspnea on exertion    Unstable angina (CMS/HCC)    Overview     Added automatically from request for surgery [id number]           Additional items to add includes myocardial bridging          Recommendations/plans:      Recommend patient continue on his current cardiac regimen including the aspirin 81, Lipitor 40, Coreg 3.125 twice daily, Cardizem 120, HCTZ 25 as his symptoms have completely resolved and he feels good.  Blood pressure and heart rate well controlled.      Follow-up with cardiology in 3 months      Finn Mustafa DO   Interventional cardiology  [facility]  07/23/2021  15:04 CDT

## 2021-09-15 ENCOUNTER — TELEPHONE (OUTPATIENT)
Dept: CARDIOLOGY | Facility: CLINIC | Age: 66
End: 2021-09-15

## 2021-09-15 NOTE — TELEPHONE ENCOUNTER
PT CALLED HE WENT TO SEE HIS DERMATOLOGIST FOR A RASH, HE THINKS IT MAY BE THE HEART MEDICATIONS THAT IS CAUSING THE RASH.    PLEASE ADVISE

## 2021-09-20 ENCOUNTER — OFFICE VISIT (OUTPATIENT)
Dept: CARDIOLOGY | Facility: CLINIC | Age: 66
End: 2021-09-20

## 2021-09-20 VITALS
HEART RATE: 60 BPM | DIASTOLIC BLOOD PRESSURE: 60 MMHG | OXYGEN SATURATION: 98 % | SYSTOLIC BLOOD PRESSURE: 110 MMHG | HEIGHT: 69 IN | BODY MASS INDEX: 25.18 KG/M2 | WEIGHT: 170 LBS

## 2021-09-20 DIAGNOSIS — E78.49 OTHER HYPERLIPIDEMIA: ICD-10-CM

## 2021-09-20 DIAGNOSIS — I10 ESSENTIAL HYPERTENSION: Primary | ICD-10-CM

## 2021-09-20 DIAGNOSIS — R21 RASH: ICD-10-CM

## 2021-09-20 PROCEDURE — 99213 OFFICE O/P EST LOW 20 MIN: CPT | Performed by: EMERGENCY MEDICINE

## 2021-09-20 RX ORDER — BROMFENAC SODIUM 0.7 MG/ML
SOLUTION/ DROPS OPHTHALMIC
Status: ON HOLD | COMMUNITY
Start: 2021-09-13 | End: 2021-11-04

## 2021-09-20 RX ORDER — VERAPAMIL HYDROCHLORIDE 240 MG/1
240 TABLET, FILM COATED, EXTENDED RELEASE ORAL DAILY
Qty: 30 TABLET | Refills: 11 | Status: ON HOLD | OUTPATIENT
Start: 2021-09-20 | End: 2021-11-04

## 2021-09-20 RX ORDER — BRIMONIDINE TARTRATE 2 MG/ML
1 SOLUTION/ DROPS OPHTHALMIC 2 TIMES DAILY
Status: ON HOLD | COMMUNITY
Start: 2021-09-02 | End: 2021-11-04

## 2021-09-20 RX ORDER — TOBRAMYCIN 3 MG/ML
SOLUTION/ DROPS OPHTHALMIC
Status: ON HOLD | COMMUNITY
Start: 2021-09-09 | End: 2021-11-04

## 2021-09-20 RX ORDER — CLOBETASOL PROPIONATE 0.46 MG/ML
SOLUTION TOPICAL
Status: ON HOLD | COMMUNITY
Start: 2021-09-15 | End: 2021-11-04

## 2021-09-20 NOTE — PROGRESS NOTES
Subjective:     Encounter Date:09/20/2021      Patient ID: Dereje Salazar is a 66 y.o. male.    Chief Complaint:  History of Present Illness    Patient is a 66-year-old male who is a 2-month follow-up today.  He has history of hypertension, hyperlipidemia and nicotine abuse.  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  He has since been to a dermatologist due to a significant truncal rash.  Dermatology feels that the likely etiology is the Cardizem.  Patient has been off of this agent for the last 4 days and states that the rash continues to worsen.  Since he is off the Cardizem, we will start him on verapamil 240 today to take its place.  He denies any significant chest pain, shortness of breath or palpitations.    Review of Systems   Constitutional: Negative for diaphoresis, fever and malaise/fatigue.   HENT: Negative for congestion.    Eyes: Negative for vision loss in left eye and vision loss in right eye.   Cardiovascular: Negative for chest pain, claudication, dyspnea on exertion, irregular heartbeat, leg swelling, orthopnea, palpitations and syncope.   Respiratory: Negative for cough, shortness of breath and wheezing.    Hematologic/Lymphatic: Negative for adenopathy.   Skin: Positive for itching and rash.   Musculoskeletal: Negative for joint pain and joint swelling.   Gastrointestinal: Negative for abdominal pain, diarrhea, nausea and vomiting.   Neurological: Negative for excessive daytime sleepiness, dizziness, focal weakness, light-headedness, numbness and weakness.   Psychiatric/Behavioral: Negative for depression. The patient does not have insomnia.            Current Outpatient Medications:   •  aspirin 81 MG EC tablet, Take 81 mg by mouth daily., Disp: , Rfl:   •  atorvastatin (LIPITOR) 40 MG tablet, TK 1 T PO QD, Disp: , Rfl: 0  •  brimonidine (ALPHAGAN) 0.2 % ophthalmic solution, Administer 1 drop to both  eyes 2 (Two) Times a Day., Disp: , Rfl:   •  carvedilol (COREG) 3.125 MG tablet, Take 3.125 mg by mouth 2 (Two) Times a Day With Meals., Disp: , Rfl:   •  cetirizine (zyrTEC) 10 MG tablet, Take 10 mg by mouth Daily., Disp: , Rfl:   •  clobetasol (TEMOVATE) 0.05 % external solution, MIX WITH 1LB. JAR OF CERAVE CREAM. APPLY FROM NECK DOWN DAILY, Disp: , Rfl:   •  Coenzyme Q10 (CO Q 10 PO), Take  by mouth., Disp: , Rfl:   •  Cyanocobalamin (VITAMIN B 12 PO), Take  by mouth., Disp: , Rfl:   •  hydroCHLOROthiazide (HYDRODIURIL) 25 MG tablet, Take 25 mg by mouth Daily., Disp: , Rfl:   •  HYDROcodone-acetaminophen (NORCO) 7.5-325 MG per tablet, , Disp: , Rfl:   •  Inulin (FIBER CHOICE PO), Take  by mouth Daily., Disp: , Rfl:   •  Multiple Vitamins-Minerals (CENTRUM SILVER PO), Take  by mouth., Disp: , Rfl:   •  nystatin (MYCOSTATIN) 063865 UNIT/ML suspension, , Disp: , Rfl:   •  pantoprazole (PROTONIX) 40 MG EC tablet, TK 1 T PO Q MORNING, Disp: , Rfl: 3  •  POTASSIUM CHLORIDE PO, Take  by mouth., Disp: , Rfl:   •  Prolensa 0.07 % solution, INSTILL 1 DROP INTO THE AFFECTED EYE EVERY DAY AS DIRECTED. START 1 DAY PRIOR TO SURGERY, Disp: , Rfl:   •  tobramycin 0.3 % solution ophthalmic solution, INSTILL 1 DROP IN LEFT EYE THREE TIMES DAILY, Disp: , Rfl:   •  verapamil SR (CALAN-SR) 240 MG CR tablet, Take 1 tablet by mouth Daily., Disp: 30 tablet, Rfl: 11       Objective:      Vitals:    09/20/21 1043   BP: 110/60   Pulse: 60   SpO2: 98%     Vitals and nursing note reviewed.   Constitutional:       Appearance: Normal and healthy appearance. Well-developed and not in distress.   Eyes:      Extraocular Movements: Extraocular movements intact.      Pupils: Pupils are equal, round, and reactive to light.   HENT:      Head: Normocephalic and atraumatic.    Mouth/Throat:      Pharynx: Oropharynx is clear.   Neck:      Vascular: JVD normal.      Trachea: Trachea normal.   Pulmonary:      Effort: Pulmonary effort is normal.       Breath sounds: Normal breath sounds. No wheezing. No rhonchi. No rales.   Cardiovascular:      PMI at left midclavicular line. Normal rate. Regular rhythm. Normal S1. Normal S2.      Murmurs: There is a grade 2/6 systolic murmur.      No gallop. No click. No rub.   Pulses:     Dorsalis pedis: 2+ bilaterally.     Posterior tibial: 2+ bilaterally.  Abdominal:      General: Bowel sounds are normal.      Palpations: Abdomen is soft.      Tenderness: There is no abdominal tenderness.   Musculoskeletal: Normal range of motion.      Cervical back: Normal range of motion and neck supple. Skin:     General: Skin is warm and dry.      Capillary Refill: Capillary refill takes less than 2 seconds.      Findings: Rash present. Rash is macular.   Feet:      Right foot:      Skin integrity: Skin integrity normal.      Left foot:      Skin integrity: Skin integrity normal.   Neurological:      Mental Status: Alert and oriented to person, place and time.      Cranial Nerves: Cranial nerves are intact.      Sensory: Sensation is intact.      Motor: Motor function is intact.      Coordination: Coordination is intact.   Psychiatric:         Speech: Speech normal.         Behavior: Behavior is cooperative.         Lab Review:       Procedures      Assessment/Plan:     Problem List Items Addressed This Visit        Cardiac and Vasculature    Essential hypertension - Primary    Relevant Medications    verapamil SR (CALAN-SR) 240 MG CR tablet    Other hyperlipidemia       Skin    Rash    Relevant Medications    clobetasol (TEMOVATE) 0.05 % external solution            Recommendations/plans:    Possible drug related rash.  He has been off of the Cardizem for 4 days.  We will start him on verapamil to replace it for his significant myocardial bridging as the calcium channel blocker class seems to work very well to control his symptoms.    Follow-up with cardiology in 3 months      Finn Mustafa DO   Interventional cardiology  [facility]  [facility]  09/20/2021  13:52 CDT

## 2021-10-01 ENCOUNTER — TELEPHONE (OUTPATIENT)
Dept: CARDIOLOGY | Facility: CLINIC | Age: 66
End: 2021-10-01

## 2021-10-01 NOTE — TELEPHONE ENCOUNTER
PT CALLED WOULD LIKE TO GO BACK TO DILTIAZEM, HIS RASH HAS NOT GOTTEN BETTER BEING OFF OF IT AND HE FELT BETTER ON IT INSTEAD OF THE VERAPAMIL    PLEASE ADVISE

## 2021-10-05 RX ORDER — DILTIAZEM HYDROCHLORIDE 120 MG/1
120 CAPSULE, COATED, EXTENDED RELEASE ORAL DAILY
COMMUNITY
End: 2022-06-17 | Stop reason: SDUPTHER

## 2021-10-22 ENCOUNTER — OFFICE VISIT (OUTPATIENT)
Dept: GASTROENTEROLOGY | Facility: CLINIC | Age: 66
End: 2021-10-22

## 2021-10-22 VITALS
SYSTOLIC BLOOD PRESSURE: 126 MMHG | BODY MASS INDEX: 25.62 KG/M2 | WEIGHT: 173 LBS | DIASTOLIC BLOOD PRESSURE: 66 MMHG | HEART RATE: 74 BPM | OXYGEN SATURATION: 100 % | HEIGHT: 69 IN | TEMPERATURE: 96.6 F

## 2021-10-22 DIAGNOSIS — Z86.010 HISTORY OF ADENOMATOUS POLYP OF COLON: Primary | ICD-10-CM

## 2021-10-22 DIAGNOSIS — Z80.0 FAMILY HX OF COLON CANCER: ICD-10-CM

## 2021-10-22 DIAGNOSIS — Z83.71 FAMILY HX COLONIC POLYPS: ICD-10-CM

## 2021-10-22 PROBLEM — Z86.0101 HISTORY OF ADENOMATOUS POLYP OF COLON: Status: ACTIVE | Noted: 2021-10-22

## 2021-10-22 PROBLEM — Z83.719 FAMILY HX COLONIC POLYPS: Status: ACTIVE | Noted: 2021-10-22

## 2021-10-22 PROCEDURE — S0285 CNSLT BEFORE SCREEN COLONOSC: HCPCS | Performed by: NURSE PRACTITIONER

## 2021-10-22 RX ORDER — FLUTICASONE PROPIONATE 50 MCG
2 SPRAY, SUSPENSION (ML) NASAL DAILY
COMMUNITY

## 2021-10-22 RX ORDER — POLYETHYLENE GLYCOL 3350, SODIUM SULFATE ANHYDROUS, SODIUM BICARBONATE, SODIUM CHLORIDE, POTASSIUM CHLORIDE 236; 22.74; 6.74; 5.86; 2.97 G/4L; G/4L; G/4L; G/4L; G/4L
4 POWDER, FOR SOLUTION ORAL ONCE
Qty: 4000 ML | Refills: 0 | Status: SHIPPED | OUTPATIENT
Start: 2021-10-22 | End: 2021-10-22

## 2021-10-22 RX ORDER — LOSARTAN POTASSIUM 100 MG/1
100 TABLET ORAL DAILY
COMMUNITY
End: 2022-06-17 | Stop reason: SDUPTHER

## 2021-10-22 NOTE — PROGRESS NOTES
[facility] Gastroenterology    Primary Physician Wilbert Saleem MD    10/22/2021    Dereje Salazar   1955      Chief Complaint   Patient presents with   • Colonoscopy       Subjective     HPI    Dereje Salazar is a 66 y.o. male who presents as a referral for preventative maintenance. He has no complaints of nausea or vomiting. No change in bowels. No wt loss. No BRBPR. No melena. No abdominal pain.        Last colonoscopy was 12/2016 noting diverticulosis.  The patient does have history of adenomatous colon polyps 2010. The patient does not have history of colon cancer.   There is a family history of colon polyps sister. There is a family history of colon cancer brother and father.     Past Medical History:   Diagnosis Date   • Colon polyps    • Diverticulosis    • GERD (gastroesophageal reflux disease)    • Heart disorder    • Hyperlipidemia    • Hypertension    • IBS (irritable bowel syndrome)    • Knee pain        Past Surgical History:   Procedure Laterality Date   • CARDIAC CATHETERIZATION Right 7/15/2021    Procedure: Left Heart Cath w poss intervention, right radial, US guided;  Surgeon: Finn Mustafa DO;  Location: [facility] CATH INVASIVE LOCATION;  Service: Cardiovascular;  Laterality: Right;   • COLONOSCOPY  2006    polyps present   • COLONOSCOPY  08/20/2010    clean based ulcerations covering ton lumen @ hepatic flexure, (5) polypectomies, moderate diverticulosis left colon   • COLONOSCOPY  11/12/2010    resolved colonic ulcerations, small polypectomies, moderate diverticulosis   • COLONOSCOPY  12/31/2013   • COLONOSCOPY N/A 12/16/2016    Procedure: COLONOSCOPY WITH ANESTHESIA;  Surgeon: Boris Castañeda MD;  Location: [facility] ENDOSCOPY;  Service:    • ENDOSCOPY  2006    states hiatal hernia   • ENDOSCOPY N/A 12/16/2016    Procedure: ESOPHAGOGASTRODUODENOSCOPY WITH ANESTHESIA;  Surgeon: Boris Castañeda MD;  Location: [facility] ENDOSCOPY;  Service:    • EYE SURGERY     • KNEE SURGERY          Outpatient Medications Marked as Taking for the 10/22/21 encounter (Office Visit) with Soniya Palomino APRN   Medication Sig Dispense Refill   • aspirin 81 MG EC tablet Take 81 mg by mouth daily.     • atorvastatin (LIPITOR) 40 MG tablet TK 1 T PO QD  0   • cetirizine (zyrTEC) 10 MG tablet Take 10 mg by mouth Daily.     • dilTIAZem CD (dilTIAZem CD) 120 MG 24 hr capsule Take 120 mg by mouth Daily.     • fluticasone (FLONASE) 50 MCG/ACT nasal spray 2 sprays into the nostril(s) as directed by provider Daily.     • hydroCHLOROthiazide (HYDRODIURIL) 25 MG tablet Take 25 mg by mouth Daily.     • HYDROcodone-acetaminophen (NORCO) 7.5-325 MG per tablet      • losartan (COZAAR) 100 MG tablet Take 100 mg by mouth Daily.     • Multiple Vitamins-Minerals (CENTRUM SILVER PO) Take  by mouth.     • pantoprazole (PROTONIX) 40 MG EC tablet TK 1 T PO Q MORNING  3   • POTASSIUM CHLORIDE PO Take  by mouth.         Allergies   Allergen Reactions   • Other Other (See Comments)     Patient states he is allergic to some abx but cant name them       Social History     Socioeconomic History   • Marital status: Single   Tobacco Use   • Smoking status: Current Every Day Smoker     Packs/day: 1.00     Types: Cigarettes   • Smokeless tobacco: Never Used   Vaping Use   • Vaping Use: Never used   Substance and Sexual Activity   • Alcohol use: Yes     Comment: occ   • Drug use: No   • Sexual activity: Defer       Family History   Problem Relation Age of Onset   • Colon cancer Father    • Colon polyps Sister    • Colon cancer Brother    • Heart disease Mother        Review of Systems   Constitutional: Negative for chills, fever and unexpected weight change.   Respiratory: Negative for cough, shortness of breath and wheezing.    Cardiovascular: Negative for chest pain and palpitations.   Gastrointestinal: Negative for abdominal distention, abdominal pain, anal bleeding, blood in stool, constipation, diarrhea, nausea and vomiting.        Objective     Vitals:    10/22/21 0820   BP: 126/66   Pulse: 74   Temp: 96.6 °F (35.9 °C)   SpO2: 100%         10/22/21  0820   Weight: 78.5 kg (173 lb)     Body mass index is 25.55 kg/m².    Physical Exam  Vitals reviewed.   Constitutional:       General: He is not in acute distress.  Cardiovascular:      Rate and Rhythm: Normal rate and regular rhythm.      Heart sounds: Normal heart sounds.   Pulmonary:      Effort: Pulmonary effort is normal.      Breath sounds: Normal breath sounds.   Abdominal:      General: Bowel sounds are normal. There is no distension.      Palpations: Abdomen is soft.      Tenderness: There is no abdominal tenderness.   Skin:     General: Skin is warm and dry.   Neurological:      Mental Status: He is alert.         Imaging Results (Most Recent)     None          Assessment/Plan     Diagnoses and all orders for this visit:    1. History of adenomatous polyp of colon (Primary)  -     Case Request; Standing  -     Case Request    2. Family hx of colon cancer  -     Case Request; Standing  -     Case Request    3. Family hx colonic polyps  -     Case Request; Standing  -     Case Request    Other orders  -     Follow Anesthesia Guidelines / Protocol; Future  -     Obtain Informed Consent; Future  -     polyethylene glycol (Golytely) 236 g solution; Take 4,000 mL by mouth 1 (One) Time for 1 dose. Take as directed per instruction sheet.  Dispense: 4000 mL; Refill: 0       Plan for colonoscopy. The patient was advised to take any blood pressure or heart  medications the morning of  procedure if that is when he/she normally takes.                COLONOSCOPY WITH ANESTHESIA (N/A)  All risks, benefits, alternatives, and indications of colonoscopy procedure have been discussed with the patient. Risks to include perforation of the colon requiring possible surgery or colostomy, risk of bleeding from biopsies or removal of colon tissue, possibility of missing a colon polyp or cancer, or adverse  drug reaction.  Benefits to include the diagnosis and management of disease of the colon and rectum. Alternatives to include barium enema, radiographic evaluation, lab testing or no intervention. Pt verbalizes understanding and agrees.         CABRERA Almendarez

## 2021-11-04 ENCOUNTER — HOSPITAL ENCOUNTER (OUTPATIENT)
Facility: HOSPITAL | Age: 66
Setting detail: HOSPITAL OUTPATIENT SURGERY
Discharge: HOME | End: 2021-11-04
Attending: INTERNAL MEDICINE | Admitting: INTERNAL MEDICINE

## 2021-11-04 ENCOUNTER — ANESTHESIA (OUTPATIENT)
Dept: GASTROENTEROLOGY | Facility: HOSPITAL | Age: 66
End: 2021-11-04

## 2021-11-04 ENCOUNTER — ANESTHESIA EVENT (OUTPATIENT)
Dept: GASTROENTEROLOGY | Facility: HOSPITAL | Age: 66
End: 2021-11-04

## 2021-11-04 VITALS
DIASTOLIC BLOOD PRESSURE: 85 MMHG | SYSTOLIC BLOOD PRESSURE: 106 MMHG | WEIGHT: 168 LBS | OXYGEN SATURATION: 99 % | TEMPERATURE: 96.2 F | HEIGHT: 69 IN | BODY MASS INDEX: 24.88 KG/M2 | HEART RATE: 64 BPM | RESPIRATION RATE: 19 BRPM

## 2021-11-04 DIAGNOSIS — Z83.71 FAMILY HX COLONIC POLYPS: ICD-10-CM

## 2021-11-04 DIAGNOSIS — Z80.0 FAMILY HX OF COLON CANCER: ICD-10-CM

## 2021-11-04 DIAGNOSIS — Z86.010 HISTORY OF ADENOMATOUS POLYP OF COLON: ICD-10-CM

## 2021-11-04 PROCEDURE — 25010000002 PROPOFOL 10 MG/ML EMULSION: Performed by: NURSE ANESTHETIST, CERTIFIED REGISTERED

## 2021-11-04 PROCEDURE — 88305 TISSUE EXAM BY PATHOLOGIST: CPT | Performed by: INTERNAL MEDICINE

## 2021-11-04 PROCEDURE — 45385 COLONOSCOPY W/LESION REMOVAL: CPT | Performed by: INTERNAL MEDICINE

## 2021-11-04 PROCEDURE — C1889 IMPLANT/INSERT DEVICE, NOC: HCPCS | Performed by: INTERNAL MEDICINE

## 2021-11-04 DEVICE — WORKING LENGTH 235CM, WORKING CHANNEL 2.8MM
Type: IMPLANTABLE DEVICE | Site: SIGMOID COLON | Status: FUNCTIONAL
Brand: RESOLUTION 360 CLIP

## 2021-11-04 RX ORDER — SODIUM CHLORIDE 0.9 % (FLUSH) 0.9 %
10 SYRINGE (ML) INJECTION AS NEEDED
Status: DISCONTINUED | OUTPATIENT
Start: 2021-11-04 | End: 2021-11-04 | Stop reason: HOSPADM

## 2021-11-04 RX ORDER — LIDOCAINE HYDROCHLORIDE 10 MG/ML
0.5 INJECTION, SOLUTION EPIDURAL; INFILTRATION; INTRACAUDAL; PERINEURAL ONCE AS NEEDED
Status: DISCONTINUED | OUTPATIENT
Start: 2021-11-04 | End: 2021-11-04 | Stop reason: HOSPADM

## 2021-11-04 RX ORDER — ONDANSETRON 2 MG/ML
4 INJECTION INTRAMUSCULAR; INTRAVENOUS ONCE AS NEEDED
Status: DISCONTINUED | OUTPATIENT
Start: 2021-11-04 | End: 2021-11-04 | Stop reason: HOSPADM

## 2021-11-04 RX ORDER — SODIUM CHLORIDE 9 MG/ML
500 INJECTION, SOLUTION INTRAVENOUS CONTINUOUS PRN
Status: DISCONTINUED | OUTPATIENT
Start: 2021-11-04 | End: 2021-11-04 | Stop reason: HOSPADM

## 2021-11-04 RX ORDER — PROPOFOL 10 MG/ML
VIAL (ML) INTRAVENOUS AS NEEDED
Status: DISCONTINUED | OUTPATIENT
Start: 2021-11-04 | End: 2021-11-04 | Stop reason: SURG

## 2021-11-04 RX ADMIN — PROPOFOL 50 MG: 10 INJECTION, EMULSION INTRAVENOUS at 09:43

## 2021-11-04 RX ADMIN — SODIUM CHLORIDE 500 ML: 9 INJECTION, SOLUTION INTRAVENOUS at 08:51

## 2021-11-04 RX ADMIN — PROPOFOL 50 MG: 10 INJECTION, EMULSION INTRAVENOUS at 09:34

## 2021-11-04 RX ADMIN — PROPOFOL 100 MG: 10 INJECTION, EMULSION INTRAVENOUS at 09:27

## 2021-11-04 RX ADMIN — PROPOFOL 50 MG: 10 INJECTION, EMULSION INTRAVENOUS at 09:39

## 2021-11-04 NOTE — ANESTHESIA PREPROCEDURE EVALUATION
Anesthesia Evaluation     Patient summary reviewed   NPO Solid Status: > 8 hours  NPO Liquid Status: > 8 hours           Airway   Mallampati: II  TM distance: >3 FB  Neck ROM: full  Dental    (+) partials        Pulmonary    (+) a smoker Current Smoked day of surgery,   (-) COPD, asthma, sleep apnea  Cardiovascular   Exercise tolerance: good (4-7 METS)    (+) hypertension, CAD (LAD bridging), hyperlipidemia,   (-) past MI, dysrhythmias, cardiac stents    ROS comment: Cath:   Impression:  1.  No significant epicardial coronary artery disease as described above  2.  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  Hypertension  4.  Hyperlipidemia  5.  Nicotine abuse         Neuro/Psych  (-) seizures, TIA, CVA  GI/Hepatic/Renal/Endo    (+)  GERD,    (-) liver disease, no renal disease, diabetes    Musculoskeletal     Abdominal    Substance History      OB/GYN          Other                        Anesthesia Plan    ASA 2     MAC     intravenous induction     Anesthetic plan, all risks, benefits, and alternatives have been provided, discussed and informed consent has been obtained with: patient.

## 2021-11-04 NOTE — ANESTHESIA POSTPROCEDURE EVALUATION
Patient: Dereje Salazar    Procedure Summary     Date: 11/04/21 Room / Location:  [facility] /  [facility]    Anesthesia Start: 0926 Anesthesia Stop: 0951    Procedure: COLONOSCOPY WITH ANESTHESIA (N/A ) Diagnosis:       History of adenomatous polyp of colon      Family hx of colon cancer      Family hx colonic polyps      (History of adenomatous polyp of colon [Z86.010])      (Family hx of colon cancer [Z80.0])      (Family hx colonic polyps [Z83.71])    Surgeons: Boris Castañeda MD Provider: Brandon Gordon CRNA    Anesthesia Type: MAC ASA Status: 2          Anesthesia Type: MAC    Vitals  No vitals data found for the desired time range.          Post Anesthesia Care and Evaluation    Patient location during evaluation: PHASE II  Patient participation: complete - patient participated  Level of consciousness: awake and alert  Pain score: 0  Pain management: adequate  Airway patency: patent  Anesthetic complications: No anesthetic complications  PONV Status: none  Cardiovascular status: acceptable and stable  Respiratory status: acceptable  Hydration status: acceptable

## 2021-11-05 LAB
CYTO UR: NORMAL
LAB AP CASE REPORT: NORMAL
PATH REPORT.FINAL DX SPEC: NORMAL
PATH REPORT.GROSS SPEC: NORMAL

## 2021-12-17 ENCOUNTER — OFFICE VISIT (OUTPATIENT)
Dept: CARDIOLOGY | Facility: CLINIC | Age: 66
End: 2021-12-17

## 2021-12-17 VITALS
SYSTOLIC BLOOD PRESSURE: 110 MMHG | HEIGHT: 69 IN | DIASTOLIC BLOOD PRESSURE: 52 MMHG | HEART RATE: 68 BPM | BODY MASS INDEX: 24.88 KG/M2 | WEIGHT: 168 LBS | OXYGEN SATURATION: 98 %

## 2021-12-17 DIAGNOSIS — I10 ESSENTIAL HYPERTENSION: Primary | ICD-10-CM

## 2021-12-17 DIAGNOSIS — E78.49 OTHER HYPERLIPIDEMIA: ICD-10-CM

## 2021-12-17 PROCEDURE — 93000 ELECTROCARDIOGRAM COMPLETE: CPT | Performed by: EMERGENCY MEDICINE

## 2021-12-17 PROCEDURE — 99213 OFFICE O/P EST LOW 20 MIN: CPT | Performed by: EMERGENCY MEDICINE

## 2021-12-19 NOTE — PROGRESS NOTES
Subjective:     Encounter Date:12/17/2021      Patient ID: Dereje Salazar is a 66 y.o. male.    Chief Complaint:  History of Present Illness    66-year-old female who is here for a 3-month follow-up.  He has a history of myocardial bridging, hypertension and hyperlipidemia.  He is doing well today with no significant cardiac symptoms.  No chest pain, shortness of breath, dizziness, palpitations or syncopal events.    Review of Systems   Constitutional: Negative for diaphoresis, fever and malaise/fatigue.   HENT: Negative for congestion.    Eyes: Negative for vision loss in left eye and vision loss in right eye.   Cardiovascular: Negative for chest pain, claudication, dyspnea on exertion, irregular heartbeat, leg swelling, orthopnea, palpitations and syncope.   Respiratory: Negative for cough, shortness of breath and wheezing.    Hematologic/Lymphatic: Negative for adenopathy.   Skin: Negative for rash.   Musculoskeletal: Negative for joint pain and joint swelling.   Gastrointestinal: Negative for abdominal pain, diarrhea, nausea and vomiting.   Neurological: Negative for excessive daytime sleepiness, dizziness, focal weakness, light-headedness, numbness and weakness.   Psychiatric/Behavioral: Negative for depression. The patient does not have insomnia.            Current Outpatient Medications:   •  aspirin 81 MG EC tablet, Take 81 mg by mouth daily., Disp: , Rfl:   •  atorvastatin (LIPITOR) 40 MG tablet, TK 1 T PO QD, Disp: , Rfl: 0  •  cetirizine (zyrTEC) 10 MG tablet, Take 10 mg by mouth Daily., Disp: , Rfl:   •  dilTIAZem CD (dilTIAZem CD) 120 MG 24 hr capsule, Take 120 mg by mouth Daily., Disp: , Rfl:   •  fluticasone (FLONASE) 50 MCG/ACT nasal spray, 2 sprays into the nostril(s) as directed by provider Daily., Disp: , Rfl:   •  hydroCHLOROthiazide (HYDRODIURIL) 25 MG tablet, Take 25 mg by mouth Daily., Disp: , Rfl:   •  HYDROcodone-acetaminophen (NORCO) 7.5-325 MG per tablet, , Disp: , Rfl:   •  losartan  (COZAAR) 100 MG tablet, Take 100 mg by mouth Daily., Disp: , Rfl:   •  Multiple Vitamins-Minerals (CENTRUM SILVER PO), Take  by mouth., Disp: , Rfl:   •  pantoprazole (PROTONIX) 40 MG EC tablet, TK 1 T PO Q MORNING, Disp: , Rfl: 3  •  POTASSIUM CHLORIDE PO, Take  by mouth., Disp: , Rfl:        Objective:      Vitals:    12/17/21 1029   BP: 110/52   Pulse: 68   SpO2: 98%     Vitals and nursing note reviewed.   Constitutional:       Appearance: Normal and healthy appearance. Well-developed and not in distress.   Eyes:      Extraocular Movements: Extraocular movements intact.      Pupils: Pupils are equal, round, and reactive to light.   HENT:      Head: Normocephalic and atraumatic.    Mouth/Throat:      Pharynx: Oropharynx is clear.   Neck:      Vascular: JVD normal.      Trachea: Trachea normal.   Pulmonary:      Effort: Pulmonary effort is normal.      Breath sounds: Normal breath sounds. No wheezing. No rhonchi. No rales.   Cardiovascular:      PMI at left midclavicular line. Normal rate. Regular rhythm. Normal S1. Normal S2.      Murmurs: There is no murmur.      No gallop. No click. No rub.   Pulses:     Dorsalis pedis: 2+ bilaterally.     Posterior tibial: 2+ bilaterally.  Abdominal:      General: Bowel sounds are normal.      Palpations: Abdomen is soft.      Tenderness: There is no abdominal tenderness.   Musculoskeletal: Normal range of motion.      Cervical back: Normal range of motion and neck supple. Skin:     General: Skin is warm and dry.      Capillary Refill: Capillary refill takes less than 2 seconds.   Feet:      Right foot:      Skin integrity: Skin integrity normal.      Left foot:      Skin integrity: Skin integrity normal.   Neurological:      Mental Status: Alert and oriented to person, place and time.      Cranial Nerves: Cranial nerves are intact.      Sensory: Sensation is intact.      Motor: Motor function is intact.      Coordination: Coordination is intact.   Psychiatric:         Speech:  Speech normal.         Behavior: Behavior is cooperative.         Lab Review:         ECG 12 Lead    Date/Time: 12/19/2021 5:37 PM  Performed by: Finn Mustafa DO  Authorized by: Finn Mustafa DO   Comparison: compared with previous ECG   Similar to previous ECG  Rhythm: sinus rhythm  Rate: normal  Conduction: conduction normal  ST Segments: ST segments normal  T Waves: T waves normal  QRS axis: normal  Other: no other findings    Clinical impression: normal ECG              Assessment/Plan:     Problem List Items Addressed This Visit        Cardiac and Vasculature    Essential hypertension - Primary    Other hyperlipidemia      Myocardial bridging      Recommendations/plans:    The calcium channel blocker is working well to control his myocardial bridging symptoms.  Recommend he continue on the current dose of Cardizem 120.  Also recommend he continue on aspirin 81, Lipitor 40, HCTZ 25 and Cozaar 100 as his vitals are well controlled.    Follow-up with cardiology in 6 months or sooner if necessary      Finn Mustafa DO   Interventional cardiology  [facility]  12/17/2021  17:39 CST

## 2022-06-17 ENCOUNTER — OFFICE VISIT (OUTPATIENT)
Dept: CARDIOLOGY | Facility: CLINIC | Age: 67
End: 2022-06-17

## 2022-06-17 VITALS
SYSTOLIC BLOOD PRESSURE: 122 MMHG | DIASTOLIC BLOOD PRESSURE: 80 MMHG | OXYGEN SATURATION: 100 % | HEART RATE: 64 BPM | WEIGHT: 162 LBS | HEIGHT: 69 IN | BODY MASS INDEX: 23.99 KG/M2

## 2022-06-17 DIAGNOSIS — E78.49 OTHER HYPERLIPIDEMIA: ICD-10-CM

## 2022-06-17 DIAGNOSIS — R06.09 DYSPNEA ON EXERTION: ICD-10-CM

## 2022-06-17 DIAGNOSIS — I10 ESSENTIAL HYPERTENSION: Primary | ICD-10-CM

## 2022-06-17 DIAGNOSIS — I20.8 ANGINA AT REST: ICD-10-CM

## 2022-06-17 PROCEDURE — 99214 OFFICE O/P EST MOD 30 MIN: CPT | Performed by: EMERGENCY MEDICINE

## 2022-06-17 PROCEDURE — 93000 ELECTROCARDIOGRAM COMPLETE: CPT | Performed by: EMERGENCY MEDICINE

## 2022-06-17 RX ORDER — ATORVASTATIN CALCIUM 40 MG/1
40 TABLET, FILM COATED ORAL DAILY
Qty: 90 TABLET | Refills: 3 | Status: SHIPPED | OUTPATIENT
Start: 2022-06-17

## 2022-06-17 RX ORDER — DILTIAZEM HYDROCHLORIDE 120 MG/1
120 CAPSULE, COATED, EXTENDED RELEASE ORAL DAILY
Qty: 90 CAPSULE | Refills: 3 | Status: SHIPPED | OUTPATIENT
Start: 2022-06-17

## 2022-06-17 RX ORDER — HYDROCHLOROTHIAZIDE 25 MG/1
25 TABLET ORAL DAILY
Qty: 90 TABLET | Refills: 3 | Status: SHIPPED | OUTPATIENT
Start: 2022-06-17

## 2022-06-17 RX ORDER — NITROGLYCERIN 0.4 MG/1
TABLET SUBLINGUAL
Qty: 30 TABLET | Refills: 1 | Status: SHIPPED | OUTPATIENT
Start: 2022-06-17

## 2022-06-17 RX ORDER — LOSARTAN POTASSIUM 100 MG/1
100 TABLET ORAL DAILY
Qty: 90 TABLET | Refills: 3 | Status: SHIPPED | OUTPATIENT
Start: 2022-06-17

## 2022-06-20 NOTE — PROGRESS NOTES
Subjective:     Encounter Date:06/17/2022      Patient ID: Dereje Salazar is a 67 y.o. male.    Chief Complaint:  History of Present Illness    Patient is a 67-year-old male who is here today for a 6-month follow-up.  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  His chest pains have been controlled ever since.  He says that over the past 6 months he has had a couple of episodes of light chest pains that are not really worrisome to him.    Review of Systems   Constitutional: Negative for diaphoresis, fever and malaise/fatigue.   HENT: Negative for congestion.    Eyes: Negative for vision loss in left eye and vision loss in right eye.   Cardiovascular: Positive for chest pain. Negative for claudication, dyspnea on exertion, irregular heartbeat, leg swelling, orthopnea, palpitations and syncope.   Respiratory: Negative for cough, shortness of breath and wheezing.    Hematologic/Lymphatic: Negative for adenopathy.   Skin: Negative for rash.   Musculoskeletal: Negative for joint pain and joint swelling.   Gastrointestinal: Negative for abdominal pain, diarrhea, nausea and vomiting.   Neurological: Negative for excessive daytime sleepiness, dizziness, focal weakness, light-headedness, numbness and weakness.   Psychiatric/Behavioral: Negative for depression. The patient does not have insomnia.            Current Outpatient Medications:   •  aspirin 81 MG EC tablet, Take 81 mg by mouth daily., Disp: , Rfl:   •  atorvastatin (LIPITOR) 40 MG tablet, Take 1 tablet by mouth Daily., Disp: 90 tablet, Rfl: 3  •  cetirizine (zyrTEC) 10 MG tablet, Take 10 mg by mouth Daily., Disp: , Rfl:   •  dilTIAZem CD (CARDIZEM CD) 120 MG 24 hr capsule, Take 1 capsule by mouth Daily., Disp: 90 capsule, Rfl: 3  •  fluticasone (FLONASE) 50 MCG/ACT nasal spray, 2 sprays into the nostril(s) as directed by provider Daily.,  Disp: , Rfl:   •  hydroCHLOROthiazide (HYDRODIURIL) 25 MG tablet, Take 1 tablet by mouth Daily., Disp: 90 tablet, Rfl: 3  •  HYDROcodone-acetaminophen (NORCO) 7.5-325 MG per tablet, , Disp: , Rfl:   •  losartan (COZAAR) 100 MG tablet, Take 1 tablet by mouth Daily., Disp: 90 tablet, Rfl: 3  •  Multiple Vitamins-Minerals (CENTRUM SILVER PO), Take  by mouth., Disp: , Rfl:   •  pantoprazole (PROTONIX) 40 MG EC tablet, TK 1 T PO Q MORNING, Disp: , Rfl: 3  •  POTASSIUM CHLORIDE PO, Take  by mouth., Disp: , Rfl:   •  nitroglycerin (NITROSTAT) 0.4 MG SL tablet, 1 under the tongue as needed for angina, may repeat q5mins for up three doses, Disp: 30 tablet, Rfl: 1       Objective:      Vitals:    06/17/22 1033   BP: 122/80   Pulse: 64   SpO2: 100%     Vitals and nursing note reviewed.   Constitutional:       Appearance: Normal and healthy appearance. Well-developed and not in distress.   Eyes:      Extraocular Movements: Extraocular movements intact.      Pupils: Pupils are equal, round, and reactive to light.   HENT:      Head: Normocephalic and atraumatic.    Mouth/Throat:      Pharynx: Oropharynx is clear.   Neck:      Vascular: JVD normal.      Trachea: Trachea normal.   Pulmonary:      Effort: Pulmonary effort is normal.      Breath sounds: Normal breath sounds. No wheezing. No rhonchi. No rales.   Cardiovascular:      PMI at left midclavicular line. Normal rate. Regular rhythm. Normal S1. Normal S2.      Murmurs: There is no murmur.      No gallop. No click. No rub.   Pulses:     Dorsalis pedis: 2+ bilaterally.     Posterior tibial: 2+ bilaterally.  Abdominal:      General: Bowel sounds are normal.      Palpations: Abdomen is soft.      Tenderness: There is no abdominal tenderness.   Musculoskeletal: Normal range of motion.      Cervical back: Normal range of motion and neck supple. Skin:     General: Skin is warm and dry.      Capillary Refill: Capillary refill takes less than 2 seconds.   Feet:      Right foot:       Skin integrity: Skin integrity normal.      Left foot:      Skin integrity: Skin integrity normal.   Neurological:      Mental Status: Alert and oriented to person, place and time.      Cranial Nerves: Cranial nerves are intact.      Sensory: Sensation is intact.      Motor: Motor function is intact.      Coordination: Coordination is intact.   Psychiatric:         Speech: Speech normal.         Behavior: Behavior is cooperative.         Lab Review:         ECG 12 Lead    Date/Time: 6/19/2022 8:02 PM  Performed by: Finn Mustafa DO  Authorized by: Finn Mustafa DO   Comparison: compared with previous ECG   Rhythm: sinus rhythm  Rate: normal  Conduction: 1st degree AV block  ST Segments: ST segments normal  T Waves: T waves normal  QRS axis: normal  Other: no other findings    Clinical impression: abnormal EKG              Assessment/Plan:     Problem List Items Addressed This Visit        Cardiac and Vasculature    Essential hypertension - Primary    Relevant Medications    dilTIAZem CD (CARDIZEM CD) 120 MG 24 hr capsule    hydroCHLOROthiazide (HYDRODIURIL) 25 MG tablet    losartan (COZAAR) 100 MG tablet    Other hyperlipidemia    Relevant Medications    atorvastatin (LIPITOR) 40 MG tablet    Angina at rest (HCC)    Relevant Medications    nitroglycerin (NITROSTAT) 0.4 MG SL tablet    dilTIAZem CD (CARDIZEM CD) 120 MG 24 hr capsule    Dyspnea on exertion      Myocardial bridging      Recommendations/plans:    We will refill his cardiac medications today: Aspirin 81, Lipitor 40, Cardizem 120, HCTZ 25, Cozaar 100.  Nitro sublingual as needed for the brief episodes of chest pain that he still gets from the bridging.      Follow-up with cardiology in 1 year or sooner if necessary    Finn Mustafa DO   Interventional cardiology  [facility]  06/17/2022  20:04 CDT

## 2022-07-13 ENCOUNTER — TELEPHONE (OUTPATIENT)
Dept: CARDIOLOGY | Facility: CLINIC | Age: 67
End: 2022-07-13

## 2022-07-13 NOTE — TELEPHONE ENCOUNTER
PT CALLED WAS SEEN ON 06/17/22  AND STATES YOU TALKED ABOUT UPPING HIS DILTIZZEM, IS CURRENTLY  MG.  HE WOULD LIKE TODO THIS    PLEASE ADVISE

## 2023-04-21 NOTE — TELEPHONE ENCOUNTER
Pt states he started taking the bentyl about a week ago and stopped Sun or Mon because of developing a rash on his face. He states he has has a rash before but not to this degree. He has an appt to see a dermatologist 8/24. He states the bentyl was helping his GI problems and he hates to quit taking it.  Pt called againstating he saw on the instructions to stay out of the sun and heat and there is no way he can do that.   V-Y Flap Text: The defect edges were debeveled with a #15 scalpel blade.  Given the location of the defect, shape of the defect and the proximity to free margins a V-Y flap was deemed most appropriate.  Using a sterile surgical marker, an appropriate advancement flap was drawn incorporating the defect and placing the expected incisions within the relaxed skin tension lines where possible.    The area thus outlined was incised deep to adipose tissue with a #15 scalpel blade.  The skin margins were undermined to an appropriate distance in all directions utilizing iris scissors.

## 2023-09-06 NOTE — TELEPHONE ENCOUNTER
DR. ROSE'S OFFICE CALLED [phone] EXT [phone] PT IS SET FOR EYE SURGERY ON 07/08/21    PT IS SET FOR HEART CATH ON 07/30/21    THEY WANT TO KNOW IF THEY NEED TO RESCHEDULE SURGERY UNTIL AFTER CATH    PLEASE ADVISE     
Pt and Dr. Carrasco notified   
No

## 2023-12-13 NOTE — PROGRESS NOTES
"Subjective    Mr. Salazar is 68 y.o. male    Chief Complaint: Lump on testicle    History of Present Illness    68-year-old male established patient in with \"worsening right side scrotal lump and scrotal swelling\".  Previously noted to have a right spermatocele during last office visit with Dr. Bhatt in 2021.  Patient also with a history of bilateral renal cyst.  No recent repeat renal ultrasound imaging since 2021.  In regards to the scrotal swelling and mass.  Denies any pain.  Denies any pain with ejaculation.    Complain of a red rash with retraction of foreskin occasionally.    The following portions of the patient's history were reviewed and updated as appropriate: allergies, current medications, past family history, past medical history, past social history, past surgical history and problem list.    Review of Systems   Constitutional:  Negative for chills and fever.   Gastrointestinal:  Negative for abdominal pain, anal bleeding, blood in stool, nausea and vomiting.   Genitourinary:  Positive for scrotal swelling. Negative for decreased urine volume, difficulty urinating, dysuria, flank pain, frequency, hematuria, testicular pain and urgency.         Current Outpatient Medications:     aspirin 81 MG EC tablet, Take 1 tablet by mouth Daily., Disp: , Rfl:     atorvastatin (LIPITOR) 40 MG tablet, Take 1 tablet by mouth Daily., Disp: 90 tablet, Rfl: 3    cetirizine (zyrTEC) 10 MG tablet, Take 1 tablet by mouth Daily., Disp: , Rfl:     dilTIAZem CD (CARDIZEM CD) 120 MG 24 hr capsule, Take 1 capsule by mouth Daily., Disp: 90 capsule, Rfl: 3    fluticasone (FLONASE) 50 MCG/ACT nasal spray, 2 sprays into the nostril(s) as directed by provider Daily., Disp: , Rfl:     hydroCHLOROthiazide (HYDRODIURIL) 25 MG tablet, Take 1 tablet by mouth Daily., Disp: 90 tablet, Rfl: 3    HYDROcodone-acetaminophen (NORCO) 7.5-325 MG per tablet, , Disp: , Rfl:     losartan (COZAAR) 100 MG tablet, Take 1 tablet by mouth Daily., Disp: 90 " tablet, Rfl: 3    Multiple Vitamins-Minerals (CENTRUM SILVER PO), Take  by mouth., Disp: , Rfl:     nitroglycerin (NITROSTAT) 0.4 MG SL tablet, 1 under the tongue as needed for angina, may repeat q5mins for up three doses, Disp: 30 tablet, Rfl: 1    pantoprazole (PROTONIX) 40 MG EC tablet, TK 1 T PO Q MORNING, Disp: , Rfl: 3    POTASSIUM CHLORIDE PO, Take  by mouth., Disp: , Rfl:     nystatin (MYCOSTATIN) 076772 UNIT/GM cream, Apply 1 Application topically to the appropriate area as directed 2 (Two) Times a Day., Disp: 30 g, Rfl: 5    Past Medical History:   Diagnosis Date    Colon polyps     Diverticulosis     GERD (gastroesophageal reflux disease)     Heart disorder     Hyperlipidemia     Hypertension     IBS (irritable bowel syndrome)     Knee pain        Past Surgical History:   Procedure Laterality Date    CARDIAC CATHETERIZATION Right 7/15/2021    Procedure: Left Heart Cath w poss intervention, right radial, US guided;  Surgeon: Finn Mustafa DO;  Location: [facility] CATH INVASIVE LOCATION;  Service: Cardiovascular;  Laterality: Right;    COLONOSCOPY  2006    polyps present    COLONOSCOPY  08/20/2010    clean based ulcerations covering ton lumen @ hepatic flexure, (5) polypectomies, moderate diverticulosis left colon    COLONOSCOPY  11/12/2010    resolved colonic ulcerations, small polypectomies, moderate diverticulosis    COLONOSCOPY  12/31/2013    COLONOSCOPY N/A 12/16/2016    Procedure: COLONOSCOPY WITH ANESTHESIA;  Surgeon: Boris Castañeda MD;  Location: [facility] ENDOSCOPY;  Service:     COLONOSCOPY N/A 11/4/2021    Procedure: COLONOSCOPY WITH ANESTHESIA;  Surgeon: Boris Castañeda MD;  Location: [facility] ENDOSCOPY;  Service: Gastroenterology;  Laterality: N/A;  pre-hx polyps  post-tics, colon polyp  pcp-[facility]    ENDOSCOPY  2006    states hiatal hernia    ENDOSCOPY N/A 12/16/2016    Procedure: ESOPHAGOGASTRODUODENOSCOPY WITH ANESTHESIA;  Surgeon: Boris Castañeda MD;  Location: [facility]  "ENDOSCOPY;  Service:     EYE SURGERY      KNEE SURGERY         Social History     Socioeconomic History    Marital status: Single   Tobacco Use    Smoking status: Every Day     Packs/day: 1     Types: Cigarettes    Smokeless tobacco: Never   Vaping Use    Vaping Use: Never used   Substance and Sexual Activity    Alcohol use: Yes     Comment: occ    Drug use: No    Sexual activity: Defer       Family History   Problem Relation Age of Onset    Colon cancer Father     Colon polyps Sister     Colon cancer Brother     Heart disease Mother        Objective    Temp 97.5 °F (36.4 °C)   Ht 175.3 cm (69.02\")   Wt 84.1 kg (185 lb 6.4 oz)   BMI 27.37 kg/m²     Physical Exam  Constitutional:       Appearance: Normal appearance.   Abdominal:      Tenderness: There is no right CVA tenderness or left CVA tenderness.   Genitourinary:     Testes:         Right: Testicular hydrocele present. Mass or varicocele not present.         Left: Mass or varicocele not present.      Epididymis:      Right: Mass present. No tenderness.      Left: No mass or tenderness.   Skin:     General: Skin is warm and dry.   Neurological:      Mental Status: He is alert and oriented to person, place, and time.   Psychiatric:         Mood and Affect: Mood normal.         Behavior: Behavior normal.             Results for orders placed or performed in visit on 12/19/23   POC Urinalysis Dipstick, Multipro    Specimen: Urine   Result Value Ref Range    Color Yellow Yellow, Straw, Dark Yellow, Nisha    Clarity, UA Clear Clear    Glucose, UA Negative Negative mg/dL    Bilirubin Negative Negative    Ketones, UA Negative Negative    Specific Gravity  1.015 1.005 - 1.030    Blood, UA Negative Negative    pH, Urine 7.0 5.0 - 8.0    Protein, POC Negative Negative mg/dL    Urobilinogen, UA 0.2 E.U./dL Normal, 0.2 E.U./dL    Nitrite, UA Negative Negative    Leukocytes Negative Negative     Assessment and Plan    Diagnoses and all orders for this visit:    1. BPH with " urinary obstruction (Primary)  -     POC Urinalysis Dipstick, Multipro    2. Balanitis  -     nystatin (MYCOSTATIN) 830838 UNIT/GM cream; Apply 1 Application topically to the appropriate area as directed 2 (Two) Times a Day.  Dispense: 30 g; Refill: 5    3. Scrotal swelling  -     US Scrotum & Testicles; Future    4. Renal cyst  -     US Renal Bilateral; Future      On assessment patient appears to have a right-sided hydrocele no solid appearing components felt.  Will obtain further evaluation with scrotal ultrasound.    Will send in as needed nystatin for balanitis as patient reports this is very mild and is happening intermittently does not feel as though Lotrisone is warranted    History of 4.8 cm septated renal cyst on the right as well as less than 1 cm proteinaceous cyst on the left for which Dr. Bhatt recommended continuing to observe.  No recent repeat renal ultrasound.    Recommend follow-up with renal ultrasound in 1 year unless new finding on scrotal ultrasound warranting treatment sooner

## 2023-12-19 ENCOUNTER — OFFICE VISIT (OUTPATIENT)
Dept: UROLOGY | Facility: CLINIC | Age: 68
End: 2023-12-19
Payer: MEDICARE

## 2023-12-19 VITALS — BODY MASS INDEX: 27.46 KG/M2 | WEIGHT: 185.4 LBS | HEIGHT: 69 IN | TEMPERATURE: 97.5 F

## 2023-12-19 DIAGNOSIS — N48.1 BALANITIS: ICD-10-CM

## 2023-12-19 DIAGNOSIS — N13.8 BPH WITH URINARY OBSTRUCTION: Primary | ICD-10-CM

## 2023-12-19 DIAGNOSIS — N50.89 SCROTAL SWELLING: ICD-10-CM

## 2023-12-19 DIAGNOSIS — N40.1 BPH WITH URINARY OBSTRUCTION: Primary | ICD-10-CM

## 2023-12-19 DIAGNOSIS — N28.1 RENAL CYST: ICD-10-CM

## 2023-12-19 LAB
BILIRUB BLD-MCNC: NEGATIVE MG/DL
CLARITY, POC: CLEAR
COLOR UR: YELLOW
GLUCOSE UR STRIP-MCNC: NEGATIVE MG/DL
KETONES UR QL: NEGATIVE
LEUKOCYTE EST, POC: NEGATIVE
NITRITE UR-MCNC: NEGATIVE MG/ML
PH UR: 7 [PH] (ref 5–8)
PROT UR STRIP-MCNC: NEGATIVE MG/DL
RBC # UR STRIP: NEGATIVE /UL
SP GR UR: 1.01 (ref 1–1.03)
UROBILINOGEN UR QL: NORMAL

## 2023-12-19 RX ORDER — NYSTATIN 100000 U/G
1 CREAM TOPICAL 2 TIMES DAILY
Qty: 30 G | Refills: 5 | Status: SHIPPED | OUTPATIENT
Start: 2023-12-19

## 2023-12-26 ENCOUNTER — TELEPHONE (OUTPATIENT)
Dept: UROLOGY | Facility: CLINIC | Age: 68
End: 2023-12-26
Payer: MEDICARE

## 2023-12-26 DIAGNOSIS — N48.1 BALANITIS: Primary | ICD-10-CM

## 2023-12-26 RX ORDER — CLOTRIMAZOLE AND BETAMETHASONE DIPROPIONATE 10; .64 MG/G; MG/G
1 CREAM TOPICAL 2 TIMES DAILY
Qty: 45 G | Refills: 5 | Status: SHIPPED | OUTPATIENT
Start: 2023-12-26

## 2023-12-26 NOTE — TELEPHONE ENCOUNTER
Patient called and stated the his Nystatin cream is not working. Could we please send in whatever else Carlita mentioned to patient. I let him know I would get a message sent to her.

## 2023-12-27 ENCOUNTER — HOSPITAL ENCOUNTER (OUTPATIENT)
Dept: ULTRASOUND IMAGING | Facility: HOSPITAL | Age: 68
Discharge: HOME | End: 2023-12-27
Payer: MEDICARE

## 2023-12-27 DIAGNOSIS — N50.89 SCROTAL SWELLING: ICD-10-CM

## 2023-12-27 PROCEDURE — 76870 US EXAM SCROTUM: CPT

## 2023-12-28 ENCOUNTER — TELEPHONE (OUTPATIENT)
Dept: UROLOGY | Facility: CLINIC | Age: 68
End: 2023-12-28
Payer: MEDICARE

## 2023-12-28 NOTE — TELEPHONE ENCOUNTER
Spoke with patient about his US   Again noted right spermatocele.  No solid appearing masses.    I advised him to give us a call if he needed us.

## 2023-12-28 NOTE — TELEPHONE ENCOUNTER
----- Message from CABRERA Dotson sent at 12/28/2023  1:28 PM CST -----  Again noted right spermatocele.  No solid appearing masses   Kelly will not prescribe medication long term. 

## 2024-01-13 NOTE — TELEPHONE ENCOUNTER
PT CALLED BACK WOULD LIKE TO MAKE AN APT TO COME SEE YOU AND TALK ABOUT THE MEDS    APT MADE FOR Friday 07/23/21   Pt c/o bilateral calf pain for past few days. No swelling. Recent COVID, c/f associated hypercoagulability.   -D dimer 1108  -VA duplex BLE pending

## 2024-02-20 NOTE — PROGRESS NOTES
Subjective    Mr. Salazar is 68 y.o. male    Chief Complaint: Worsening right scrotal swelling    History of Present Illness    68-year-old male established patient in for complaint of worsening right-sided scrotal swelling and pain for which patient noticed approximately 1 week ago with movement or with lying down.  Patient with a known right spermatocele for which we have continued to monitor.  Scrotal ultrasound from December 2023 5 cm fluid-filled area confirmed.       Complain of a red rash with retraction of foreskin occasionally.  Continues nystatin and Lotrisone when needed.    The following portions of the patient's history were reviewed and updated as appropriate: allergies, current medications, past family history, past medical history, past social history, past surgical history and problem list.    Review of Systems   Constitutional:  Negative for chills and fever.   Gastrointestinal:  Negative for abdominal pain, anal bleeding, blood in stool, nausea and vomiting.   Genitourinary:  Positive for scrotal swelling and testicular pain. Negative for dysuria and hematuria.         Current Outpatient Medications:     aspirin 81 MG EC tablet, Take 1 tablet by mouth Daily., Disp: , Rfl:     atorvastatin (LIPITOR) 40 MG tablet, Take 1 tablet by mouth Daily., Disp: 90 tablet, Rfl: 3    cetirizine (zyrTEC) 10 MG tablet, Take 1 tablet by mouth Daily., Disp: , Rfl:     clotrimazole-betamethasone (LOTRISONE) 1-0.05 % cream, Apply 1 application  topically to the appropriate area as directed 2 (Two) Times a Day. Twice daily x two weeks, Disp: 45 g, Rfl: 5    dilTIAZem CD (CARDIZEM CD) 120 MG 24 hr capsule, Take 1 capsule by mouth Daily., Disp: 90 capsule, Rfl: 3    fluticasone (FLONASE) 50 MCG/ACT nasal spray, 2 sprays into the nostril(s) as directed by provider Daily., Disp: , Rfl:     hydroCHLOROthiazide (HYDRODIURIL) 25 MG tablet, Take 1 tablet by mouth Daily., Disp: 90 tablet, Rfl: 3    HYDROcodone-acetaminophen  (NORCO) 7.5-325 MG per tablet, , Disp: , Rfl:     losartan (COZAAR) 100 MG tablet, Take 1 tablet by mouth Daily., Disp: 90 tablet, Rfl: 3    Multiple Vitamins-Minerals (CENTRUM SILVER PO), Take  by mouth., Disp: , Rfl:     nitroglycerin (NITROSTAT) 0.4 MG SL tablet, 1 under the tongue as needed for angina, may repeat q5mins for up three doses, Disp: 30 tablet, Rfl: 1    nystatin (MYCOSTATIN) 821574 UNIT/GM cream, Apply 1 Application topically to the appropriate area as directed 2 (Two) Times a Day., Disp: 30 g, Rfl: 5    pantoprazole (PROTONIX) 40 MG EC tablet, TK 1 T PO Q MORNING, Disp: , Rfl: 3    POTASSIUM CHLORIDE PO, Take  by mouth., Disp: , Rfl:     Past Medical History:   Diagnosis Date    Colon polyps     Diverticulosis     GERD (gastroesophageal reflux disease)     Heart disorder     Hyperlipidemia     Hypertension     IBS (irritable bowel syndrome)     Knee pain        Past Surgical History:   Procedure Laterality Date    CARDIAC CATHETERIZATION Right 7/15/2021    Procedure: Left Heart Cath w poss intervention, right radial, US guided;  Surgeon: Finn Mustafa DO;  Location: [facility] CATH INVASIVE LOCATION;  Service: Cardiovascular;  Laterality: Right;    COLONOSCOPY  2006    polyps present    COLONOSCOPY  08/20/2010    clean based ulcerations covering ton lumen @ hepatic flexure, (5) polypectomies, moderate diverticulosis left colon    COLONOSCOPY  11/12/2010    resolved colonic ulcerations, small polypectomies, moderate diverticulosis    COLONOSCOPY  12/31/2013    COLONOSCOPY N/A 12/16/2016    Procedure: COLONOSCOPY WITH ANESTHESIA;  Surgeon: Boris Castañeda MD;  Location: [facility] ENDOSCOPY;  Service:     COLONOSCOPY N/A 11/4/2021    Procedure: COLONOSCOPY WITH ANESTHESIA;  Surgeon: Boris Castañeda MD;  Location: [facility] ENDOSCOPY;  Service: Gastroenterology;  Laterality: N/A;  pre-hx polyps  post-tics, colon polyp  [facility]p-[facility]    ENDOSCOPY  2006    states hiatal hernia    ENDOSCOPY N/A  "12/16/2016    Procedure: ESOPHAGOGASTRODUODENOSCOPY WITH ANESTHESIA;  Surgeon: Boris Castañeda MD;  Location: [facility] ENDOSCOPY;  Service:     EYE SURGERY      KNEE SURGERY         Social History     Socioeconomic History    Marital status: Single   Tobacco Use    Smoking status: Every Day     Packs/day: 1     Types: Cigarettes    Smokeless tobacco: Never   Vaping Use    Vaping Use: Never used   Substance and Sexual Activity    Alcohol use: Yes     Comment: occ    Drug use: No    Sexual activity: Defer       Family History   Problem Relation Age of Onset    Colon cancer Father     Colon polyps Sister     Colon cancer Brother     Heart disease Mother        Objective    Temp 97.5 °F (36.4 °C)   Ht 175.3 cm (69.02\")   Wt 83.4 kg (183 lb 12.8 oz)   BMI 27.13 kg/m²     Physical Exam  Constitutional:       Appearance: Normal appearance.   Abdominal:      Tenderness: There is no right CVA tenderness or left CVA tenderness.   Genitourinary:     Testes:         Right: Varicocele present.         Left: Varicocele present.      Epididymis:      Right: Mass and tenderness present.   Skin:     General: Skin is warm and dry.   Neurological:      Mental Status: He is alert and oriented to person, place, and time.   Psychiatric:         Mood and Affect: Mood normal.         Behavior: Behavior normal.             Results for orders placed or performed in visit on 02/22/24   POC Urinalysis Dipstick, Multipro    Specimen: Urine   Result Value Ref Range    Color Yellow Yellow, Straw, Dark Yellow, Nisha    Clarity, UA Clear Clear    Glucose, UA Negative Negative mg/dL    Bilirubin Negative Negative    Ketones, UA Negative Negative    Specific Gravity  1.020 1.005 - 1.030    Blood, UA Negative Negative    pH, Urine 7.0 5.0 - 8.0    Protein, POC Negative Negative mg/dL    Urobilinogen, UA 0.2 E.U./dL Normal, 0.2 E.U./dL    Nitrite, UA Negative Negative    Leukocytes Negative Negative     Assessment and Plan    Diagnoses and all " orders for this visit:    1. BPH with urinary obstruction (Primary)  -     POC Urinalysis Dipstick, Multipro    2. Scrotal swelling  -     US Scrotum & Testicles; Future      Area does appear to have enlarged within the right scrotum.  Nontender to the touch.  Recommend repeat evaluation with scrotal ultrasound to rule out any epididymitis.  As the epididymal area did not appear tender to the touch however does seem moderately enlarged

## 2024-02-22 ENCOUNTER — HOSPITAL ENCOUNTER (OUTPATIENT)
Dept: ULTRASOUND IMAGING | Facility: HOSPITAL | Age: 69
Discharge: HOME | End: 2024-02-22
Payer: MEDICARE

## 2024-02-22 ENCOUNTER — OFFICE VISIT (OUTPATIENT)
Dept: UROLOGY | Facility: CLINIC | Age: 69
End: 2024-02-22
Payer: MEDICARE

## 2024-02-22 VITALS — BODY MASS INDEX: 27.22 KG/M2 | TEMPERATURE: 97.5 F | HEIGHT: 69 IN | WEIGHT: 183.8 LBS

## 2024-02-22 DIAGNOSIS — N50.89 SCROTAL SWELLING: ICD-10-CM

## 2024-02-22 DIAGNOSIS — N40.1 BPH WITH URINARY OBSTRUCTION: Primary | ICD-10-CM

## 2024-02-22 DIAGNOSIS — N13.8 BPH WITH URINARY OBSTRUCTION: Primary | ICD-10-CM

## 2024-02-22 LAB
BILIRUB BLD-MCNC: NEGATIVE MG/DL
CLARITY, POC: CLEAR
COLOR UR: YELLOW
GLUCOSE UR STRIP-MCNC: NEGATIVE MG/DL
KETONES UR QL: NEGATIVE
LEUKOCYTE EST, POC: NEGATIVE
NITRITE UR-MCNC: NEGATIVE MG/ML
PH UR: 7 [PH] (ref 5–8)
PROT UR STRIP-MCNC: NEGATIVE MG/DL
RBC # UR STRIP: NEGATIVE /UL
SP GR UR: 1.02 (ref 1–1.03)
UROBILINOGEN UR QL: NORMAL

## 2024-02-22 PROCEDURE — 76870 US EXAM SCROTUM: CPT

## 2024-02-23 ENCOUNTER — TELEPHONE (OUTPATIENT)
Dept: UROLOGY | Facility: CLINIC | Age: 69
End: 2024-02-23
Payer: MEDICARE

## 2024-02-23 NOTE — TELEPHONE ENCOUNTER
Spoke with patient to let him know    Large Epididymal cyst. Have spoke with dr lew and he has reviewed imaging and recommends getting pt back in with him for a follow up to discuss possible surgical intervention at this point as he doesn't feel abx would be of benefit     Got patient scheduled.

## 2024-02-23 NOTE — TELEPHONE ENCOUNTER
----- Message from CABRERA Dotson sent at 2/23/2024  8:26 AM CST -----  Large Epididymal cyst. Have spoke with dr lew and he has reviewed imaging and recommends getting pt back in with him for a follow up to discuss possible surgical intervention at this point as he doesn't feel abx would be of benefit

## 2024-02-23 NOTE — PROGRESS NOTES
Large Epididymal cyst. Have spoke with dr lew and he has reviewed imaging and recommends getting pt back in with him for a follow up to discuss possible surgical intervention at this point as he doesn't feel abx would be of benefit

## 2024-04-17 ENCOUNTER — TELEPHONE (OUTPATIENT)
Dept: UROLOGY | Facility: CLINIC | Age: 69
End: 2024-04-17

## 2024-04-17 NOTE — TELEPHONE ENCOUNTER
Caller: NHUNG HOWARD    Relationship to patient: SELF    Best call back number: [phone]    Chief complaint: SAME DAY CANCELLATION PATIENT SICK WITH FEVER RESCHEDULED FOR JUNE     Type of visit: FOLLOW UP    If rescheduling, when is the original appointment: 04-17-24

## 2024-05-24 NOTE — PROGRESS NOTES
Subjective    Mr. Salazar is 69 y.o. male    Chief Complaint: Epididymal Cyst    History of Present Illness  Patient here to discuss removal of scrotal cyst.  He was last seen by me in March 2021 and was found to have a spermatocele that was significant.  He was recently evaluated February 2024 with physical exam and scrotal ultrasound confirming a fluid collection superior to the right testicle measuring 6.3 x 6.97 cm.  The following portions of the patient's history were reviewed and updated as appropriate: allergies, current medications, past family history, past medical history, past social history, past surgical history and problem list.    Review of Systems      Current Outpatient Medications:     amoxicillin-clavulanate (AUGMENTIN) 875-125 MG per tablet, Take 1 tablet by mouth Every 12 (Twelve) Hours., Disp: , Rfl:     aspirin 81 MG EC tablet, Take 1 tablet by mouth Daily., Disp: , Rfl:     atorvastatin (LIPITOR) 40 MG tablet, Take 1 tablet by mouth Daily., Disp: 90 tablet, Rfl: 3    cetirizine (zyrTEC) 10 MG tablet, Take 1 tablet by mouth Daily., Disp: , Rfl:     clotrimazole-betamethasone (LOTRISONE) 1-0.05 % cream, Apply 1 application  topically to the appropriate area as directed 2 (Two) Times a Day. Twice daily x two weeks, Disp: 45 g, Rfl: 5    dilTIAZem CD (CARDIZEM CD) 120 MG 24 hr capsule, Take 1 capsule by mouth Daily., Disp: 90 capsule, Rfl: 3    fluticasone (FLONASE) 50 MCG/ACT nasal spray, 2 sprays into the nostril(s) as directed by provider Daily., Disp: , Rfl:     hydroCHLOROthiazide (HYDRODIURIL) 25 MG tablet, Take 1 tablet by mouth Daily., Disp: 90 tablet, Rfl: 3    HYDROcodone-acetaminophen (NORCO) 7.5-325 MG per tablet, , Disp: , Rfl:     losartan (COZAAR) 100 MG tablet, Take 1 tablet by mouth Daily., Disp: 90 tablet, Rfl: 3    Multiple Vitamins-Minerals (CENTRUM SILVER PO), Take  by mouth., Disp: , Rfl:     nitroglycerin (NITROSTAT) 0.4 MG SL tablet, 1 under the tongue as needed for angina,  may repeat q5mins for up three doses, Disp: 30 tablet, Rfl: 1    nystatin (MYCOSTATIN) 483120 UNIT/GM cream, Apply 1 Application topically to the appropriate area as directed 2 (Two) Times a Day., Disp: 30 g, Rfl: 5    pantoprazole (PROTONIX) 40 MG EC tablet, TK 1 T PO Q MORNING, Disp: , Rfl: 3    POTASSIUM CHLORIDE PO, Take  by mouth., Disp: , Rfl:     Past Medical History:   Diagnosis Date    Colon polyps     Diverticulosis     GERD (gastroesophageal reflux disease)     Heart disorder     Hyperlipidemia     Hypertension     IBS (irritable bowel syndrome)     Knee pain        Past Surgical History:   Procedure Laterality Date    CARDIAC CATHETERIZATION Right 7/15/2021    Procedure: Left Heart Cath w poss intervention, right radial, US guided;  Surgeon: Finn Mustafa DO;  Location: [facility] CATH INVASIVE LOCATION;  Service: Cardiovascular;  Laterality: Right;    COLONOSCOPY  2006    polyps present    COLONOSCOPY  08/20/2010    clean based ulcerations covering ton lumen @ hepatic flexure, (5) polypectomies, moderate diverticulosis left colon    COLONOSCOPY  11/12/2010    resolved colonic ulcerations, small polypectomies, moderate diverticulosis    COLONOSCOPY  12/31/2013    COLONOSCOPY N/A 12/16/2016    Procedure: COLONOSCOPY WITH ANESTHESIA;  Surgeon: Boris Castañeda MD;  Location: [facility] ENDOSCOPY;  Service:     COLONOSCOPY N/A 11/4/2021    Procedure: COLONOSCOPY WITH ANESTHESIA;  Surgeon: Boris Castañeda MD;  Location: [facility] ENDOSCOPY;  Service: Gastroenterology;  Laterality: N/A;  pre-hx polyps  post-tics, colon polyp  pcp-[facility]    ENDOSCOPY  2006    states hiatal hernia    ENDOSCOPY N/A 12/16/2016    Procedure: ESOPHAGOGASTRODUODENOSCOPY WITH ANESTHESIA;  Surgeon: Boris Castañeda MD;  Location: [facility] ENDOSCOPY;  Service:     EYE SURGERY      KNEE SURGERY         Social History     Socioeconomic History    Marital status: Single   Tobacco Use    Smoking status: Every Day     Current  "packs/day: 1.00     Types: Cigarettes     Passive exposure: Never    Smokeless tobacco: Never   Vaping Use    Vaping status: Never Used   Substance and Sexual Activity    Alcohol use: Yes     Comment: occ    Drug use: No    Sexual activity: Defer       Family History   Problem Relation Age of Onset    Colon cancer Father     Colon polyps Sister     Colon cancer Brother     Heart disease Mother        Objective    Temp 96.9 °F (36.1 °C)   Ht 175.3 cm (69\")   Wt 81.8 kg (180 lb 6.4 oz)   BMI 26.64 kg/m²     Physical Exam  Genitourinary:     Penis: Normal.       Testes: Normal.      Comments: Large right spermatocele            Results for orders placed or performed in visit on 06/05/24   POC Urinalysis Dipstick, Multipro    Specimen: Urine   Result Value Ref Range    Color Yellow Yellow, Straw, Dark Yellow, Nisha    Clarity, UA Clear Clear    Glucose, UA Negative Negative mg/dL    Bilirubin Negative Negative    Ketones, UA Negative Negative    Specific Gravity  1.005 1.005 - 1.030    Blood, UA Negative Negative    pH, Urine 6.0 5.0 - 8.0    Protein, POC Negative Negative mg/dL    Urobilinogen, UA 0.2 E.U./dL Normal, 0.2 E.U./dL    Nitrite, UA Negative Negative    Leukocytes Negative Negative     Assessment and Plan    Diagnoses and all orders for this visit:    1. Spermatocele of epididymis, single (Primary)  -     POC Urinalysis Dipstick, Multipro          Patient with large right-sided spermatocele however he is not bothered by symptoms he is not having pain.  I have recommended observation only he will follow-up with me on a as needed basis.      "

## 2024-06-05 ENCOUNTER — OFFICE VISIT (OUTPATIENT)
Dept: UROLOGY | Facility: CLINIC | Age: 69
End: 2024-06-05
Payer: MEDICARE

## 2024-06-05 VITALS — WEIGHT: 180.4 LBS | TEMPERATURE: 96.9 F | BODY MASS INDEX: 26.72 KG/M2 | HEIGHT: 69 IN

## 2024-06-05 DIAGNOSIS — N43.41 SPERMATOCELE OF EPIDIDYMIS, SINGLE: Primary | ICD-10-CM

## 2024-06-05 LAB
BILIRUB BLD-MCNC: NEGATIVE MG/DL
CLARITY, POC: CLEAR
COLOR UR: YELLOW
GLUCOSE UR STRIP-MCNC: NEGATIVE MG/DL
KETONES UR QL: NEGATIVE
LEUKOCYTE EST, POC: NEGATIVE
NITRITE UR-MCNC: NEGATIVE MG/ML
PH UR: 6 [PH] (ref 5–8)
PROT UR STRIP-MCNC: NEGATIVE MG/DL
RBC # UR STRIP: NEGATIVE /UL
SP GR UR: 1 (ref 1–1.03)
UROBILINOGEN UR QL: NORMAL

## 2024-06-05 RX ORDER — AMOXICILLIN AND CLAVULANATE POTASSIUM 875; 125 MG/1; MG/1
1 TABLET, FILM COATED ORAL EVERY 12 HOURS SCHEDULED
COMMUNITY
Start: 2024-05-28

## 2024-06-17 RX ORDER — DILTIAZEM HYDROCHLORIDE 120 MG/1
120 CAPSULE, COATED, EXTENDED RELEASE ORAL DAILY
Qty: 90 CAPSULE | Refills: 3 | Status: SHIPPED | OUTPATIENT
Start: 2024-06-17

## 2024-06-17 RX ORDER — HYDROCHLOROTHIAZIDE 25 MG/1
25 TABLET ORAL DAILY
Qty: 90 TABLET | Refills: 3 | Status: SHIPPED | OUTPATIENT
Start: 2024-06-17

## 2024-06-17 RX ORDER — LOSARTAN POTASSIUM 100 MG/1
100 TABLET ORAL DAILY
Qty: 90 TABLET | Refills: 3 | Status: SHIPPED | OUTPATIENT
Start: 2024-06-17

## 2024-06-17 RX ORDER — ATORVASTATIN CALCIUM 40 MG/1
40 TABLET, FILM COATED ORAL DAILY
Qty: 90 TABLET | Refills: 3 | Status: SHIPPED | OUTPATIENT
Start: 2024-06-17

## 2024-06-24 ENCOUNTER — NURSE TRIAGE (OUTPATIENT)
Dept: CALL CENTER | Facility: HOSPITAL | Age: 69
End: 2024-06-24
Payer: MEDICARE

## 2024-06-24 NOTE — TELEPHONE ENCOUNTER
Call transferred from [facility], unable to reach the office.  Caller states that he is having sharp shooting chest pain in left side that lasts a few seconds.  It started Saturday after staining the deck on Friday.  No SOA or radiation.  He states that he had this 2 years ago and Dr. 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  He states that he was told when the heart contracts it was cutting off blood flow to that area.  He was started on diltiazem and has had no further issues until Saturday.  Warm transfer to [facility] in the reading room.     Reason for Disposition  • [1] Chest pain lasts > 5 minutes AND [2] occurred in past 3 days (72 hours) (Exception: Feels exactly the same as previously diagnosed heartburn and has accompanying sour taste in mouth.)    Additional Information  • Negative: SEVERE difficulty breathing (e.g., struggling for each breath, speaks in single words)  • Negative: Difficult to awaken or acting confused (e.g., disoriented, slurred speech)  • Negative: Shock suspected (e.g., cold/pale/clammy skin, too weak to stand, low BP, rapid pulse)  • Negative: Passed out (i.e., lost consciousness, collapsed and was not responding)  • Negative: [1] Chest pain lasts > 5 minutes AND [2] age > 44  • Negative: [1] Chest pain lasts > 5 minutes AND [2] age > 30 AND [3] one or more cardiac risk factors (e.g., diabetes, high blood pressure, high cholesterol, smoker, or strong family history of heart disease)  • Negative: [1] Chest pain lasts > 5 minutes AND [2] history of heart disease (i.e., angina, heart attack, heart failure, bypass surgery, takes nitroglycerin)  • Negative: [1] Chest pain lasts > 5 minutes AND [2] described as crushing, pressure-like, or heavy  • Negative: Heart beating < 50 beats per minute OR > 140 beats per minute  • Negative: Visible sweat on face or sweat dripping down face  • Negative: Sounds like a life-threatening emergency to the triager  • Negative:  "Followed a chest injury  • Negative: SEVERE chest pain  • Negative: [1] Chest pain (or \"angina\") comes and goes AND [2] is happening more often (increasing in frequency) or getting worse (increasing in severity)  (Exception: Chest pains that last only a few seconds.)  • Negative: Pain also in shoulder(s) or arm(s) or jaw  (Exception: Pain is clearly made worse by movement.)  • Negative: Difficulty breathing  • Negative: Dizziness or lightheadedness  • Negative: Coughing up blood  • Negative: Cocaine use within last 3 days  • Negative: Major surgery in past month  • Negative: Hip or leg fracture (broken bone) in past month (or had cast on leg or ankle in past month)  • Negative: Illness requiring prolonged bedrest in past month (e.g., immobilization, long hospital stay)  • Negative: Long-distance travel in past month (e.g., car, bus, train, plane; with trip lasting 6 or more hours)  • Negative: History of prior \"blood clot\" in leg or lungs (i.e., deep vein thrombosis, pulmonary embolism)  • Negative: History of inherited increased risk of blood clots (e.g., Factor 5 Leiden, Anti-thrombin 3, Protein C or Protein S deficiency, Prothrombin mutation)  • Negative: Cancer treatment in past six months (or has cancer now)  • Negative: Taking a deep breath makes pain worse  • Negative: Patient sounds very sick or weak to the triager  • Negative: [1] Chest pain lasts > 5 minutes AND [2] occurred > 3 days ago (72 hours) AND [3] NO chest pain or cardiac symptoms now  • Negative: [1] Chest pain lasts < 5 minutes AND [2] NO chest pain or cardiac symptoms (e.g., breathing difficulty, sweating) now  (Exception: Chest pains that last only a few seconds.)  • Negative: Fever > 100.4 F (38.0 C)  • Negative: Rash in same area as pain (may be described as \"small blisters\")    Answer Assessment - Initial Assessment Questions  1. LOCATION: \"Where does it hurt?\"        Left side of chest  2. RADIATION: \"Does the pain go anywhere else?\" " "(e.g., into neck, jaw, arms, back)      no  3. ONSET: \"When did the chest pain begin?\" (Minutes, hours or days)       Off and on since Saturday.  4. PATTERN: \"Does the pain come and go, or has it been constant since it started?\"  \"Does it get worse with exertion?\"       Comes and goes  5. DURATION: \"How long does it last\" (e.g., seconds, minutes, hours)      A few seconds  6. SEVERITY: \"How bad is the pain?\"  (e.g., Scale 1-10; mild, moderate, or severe)     - MILD (1-3): doesn't interfere with normal activities      - MODERATE (4-7): interferes with normal activities or awakens from sleep     - SEVERE (8-10): excruciating pain, unable to do any normal activities        mod  7. CARDIAC RISK FACTORS: \"Do you have any history of heart problems or risk factors for heart disease?\" (e.g., angina, prior heart attack; diabetes, high blood pressure, high cholesterol, smoker, or strong family history of heart disease)      yes  8. PULMONARY RISK FACTORS: \"Do you have any history of lung disease?\"  (e.g., blood clots in lung, asthma, emphysema, birth control pills)      yes  9. CAUSE: \"What do you think is causing the chest pain?\"      Not sure  10. OTHER SYMPTOMS: \"Do you have any other symptoms?\" (e.g., dizziness, nausea, vomiting, sweating, fever, difficulty breathing, cough)        no  11. PREGNANCY: \"Is there any chance you are pregnant?\" \"When was your last menstrual period?\"        na    Protocols used: Chest Pain-ADULT-AH    "

## 2024-07-29 ENCOUNTER — OFFICE VISIT (OUTPATIENT)
Dept: GASTROENTEROLOGY | Facility: CLINIC | Age: 69
End: 2024-07-29
Payer: MEDICARE

## 2024-07-29 VITALS
BODY MASS INDEX: 26.39 KG/M2 | SYSTOLIC BLOOD PRESSURE: 110 MMHG | WEIGHT: 178.2 LBS | OXYGEN SATURATION: 95 % | HEIGHT: 69 IN | TEMPERATURE: 98 F | DIASTOLIC BLOOD PRESSURE: 66 MMHG | HEART RATE: 70 BPM

## 2024-07-29 DIAGNOSIS — R93.5 ABNORMAL CT OF THE ABDOMEN: ICD-10-CM

## 2024-07-29 DIAGNOSIS — R10.11 RUQ PAIN: Primary | ICD-10-CM

## 2024-07-29 NOTE — PROGRESS NOTES
[facility] GASTROENTEROLOGY - OFFICE NOTE    7/29/2024    Dereje Salazar   1955    Primary Physician: Wilbert Saleem MD    Chief Complaint   Patient presents with    GI Problem     Pain on right side         HISTORY OF PRESENT ILLNESS:     Dereje Salazar is a 69 y.o. male presents  with abdominal pain.  Right upper abdominal pain. This started 4/2024 and lasted 2 mo.  Thinks might have been better after passing gas or bm. Not associated with eating. No further pain.  No triggers. No change in bowel habits or rectal bleeding. No fever. No weight loss. No n/v.       Has taken pantoprazole daily for a few yrs.       IMAGING SCANNED (03/19/2024) ct abdomen/pelvis with and without contrast.   IMAGING SCANNED (05/28/2024) ultrasound abdomen.   IMAGING SCANNED (06/04/2024) HIDA scan         COLONOSCOPY (11/04/2021 09:19) recall 3 years.   Tissue Pathology Exam (11/04/2021 09:53)     UPPER GI ENDOSCOPY (12/16/2016 06:51)     Past Medical History:   Diagnosis Date    Colon polyps     Diverticulosis     GERD (gastroesophageal reflux disease)     Heart disorder     Hyperlipidemia     Hypertension     IBS (irritable bowel syndrome)     Knee pain        Past Surgical History:   Procedure Laterality Date    CARDIAC CATHETERIZATION Right 7/15/2021    Procedure: Left Heart Cath w poss intervention, right radial, US guided;  Surgeon: Finn Mustafa DO;  Location: [facility] CATH INVASIVE LOCATION;  Service: Cardiovascular;  Laterality: Right;    COLONOSCOPY  2006    polyps present    COLONOSCOPY  08/20/2010    clean based ulcerations covering ton lumen @ hepatic flexure, (5) polypectomies, moderate diverticulosis left colon    COLONOSCOPY  11/12/2010    resolved colonic ulcerations, small polypectomies, moderate diverticulosis    COLONOSCOPY  12/31/2013    COLONOSCOPY N/A 12/16/2016    Procedure: COLONOSCOPY WITH ANESTHESIA;  Surgeon: Boris Castañeda MD;  Location: [facility] ENDOSCOPY;  Service:     COLONOSCOPY N/A  11/4/2021    Procedure: COLONOSCOPY WITH ANESTHESIA;  Surgeon: Boris Castañeda MD;  Location:  [facility] ENDOSCOPY;  Service: Gastroenterology;  Laterality: N/A;  pre-hx polyps  post-tics, colon polyp  pcp-[facility]    ENDOSCOPY  2006    states hiatal hernia    ENDOSCOPY N/A 12/16/2016    Procedure: ESOPHAGOGASTRODUODENOSCOPY WITH ANESTHESIA;  Surgeon: Boris Castañeda MD;  Location:  [facility] ENDOSCOPY;  Service:     EYE SURGERY      KNEE SURGERY         Outpatient Medications Marked as Taking for the 7/29/24 encounter (Office Visit) with Soniya Palomino APRN   Medication Sig Dispense Refill    aspirin 81 MG EC tablet Take 1 tablet by mouth Daily.      atorvastatin (LIPITOR) 40 MG tablet Take 1 tablet by mouth once daily 90 tablet 3    cetirizine (zyrTEC) 10 MG tablet Take 1 tablet by mouth Daily.      dilTIAZem CD (CARDIZEM CD) 120 MG 24 hr capsule Take 1 capsule by mouth once daily 90 capsule 3    fluticasone (FLONASE) 50 MCG/ACT nasal spray 2 sprays into the nostril(s) as directed by provider Daily.      hydroCHLOROthiazide 25 MG tablet Take 1 tablet by mouth once daily 90 tablet 3    HYDROcodone-acetaminophen (NORCO) 7.5-325 MG per tablet       losartan (COZAAR) 100 MG tablet Take 1 tablet by mouth once daily 90 tablet 3    Multiple Vitamins-Minerals (CENTRUM SILVER PO) Take  by mouth.      nystatin (MYCOSTATIN) 671354 UNIT/GM cream Apply 1 Application topically to the appropriate area as directed 2 (Two) Times a Day. 30 g 5    pantoprazole (PROTONIX) 40 MG EC tablet TK 1 T PO Q MORNING  3    POTASSIUM CHLORIDE PO Take  by mouth.         Allergies   Allergen Reactions    Other Other (See Comments)     Patient states he is allergic to some abx but cant name them. Says it caused thrush.       Social History     Socioeconomic History    Marital status: Single   Tobacco Use    Smoking status: Every Day     Current packs/day: 1.00     Types: Cigarettes     Passive exposure: Never    Smokeless tobacco: Never   Vaping  "Use    Vaping status: Never Used   Substance and Sexual Activity    Alcohol use: Yes     Comment: occ    Drug use: No    Sexual activity: Defer       Family History   Problem Relation Age of Onset    Colon cancer Father     Colon polyps Sister     Colon cancer Brother     Heart disease Mother        Review of Systems   Constitutional:  Negative for chills, fever and unexpected weight change.   Respiratory:  Negative for shortness of breath.    Cardiovascular:  Negative for chest pain.   Gastrointestinal:  Negative for abdominal distention, anal bleeding, blood in stool, constipation, diarrhea, nausea and vomiting.        Vitals:    07/29/24 1048   BP: 110/66   Pulse: 70   Temp: 98 °F (36.7 °C)   SpO2: 95%   Weight: 80.8 kg (178 lb 3.2 oz)   Height: 175.3 cm (69\")      Body mass index is 26.32 kg/m².    Physical Exam  Vitals reviewed.   Constitutional:       General: He is not in acute distress.  Cardiovascular:      Rate and Rhythm: Normal rate and regular rhythm.      Heart sounds: Normal heart sounds.   Pulmonary:      Effort: Pulmonary effort is normal.      Breath sounds: Normal breath sounds.   Abdominal:      General: Bowel sounds are normal. There is no distension.      Palpations: Abdomen is soft.      Tenderness: There is no abdominal tenderness.   Skin:     General: Skin is warm and dry.   Neurological:      Mental Status: He is alert.         Results for orders placed or performed in visit on 06/05/24   POC Urinalysis Dipstick, Multipro    Specimen: Urine   Result Value Ref Range    Color Yellow Yellow, Straw, Dark Yellow, Nisha    Clarity, UA Clear Clear    Glucose, UA Negative Negative mg/dL    Bilirubin Negative Negative    Ketones, UA Negative Negative    Specific Gravity  1.005 1.005 - 1.030    Blood, UA Negative Negative    pH, Urine 6.0 5.0 - 8.0    Protein, POC Negative Negative mg/dL    Urobilinogen, UA 0.2 E.U./dL Normal, 0.2 E.U./dL    Nitrite, UA Negative Negative    Leukocytes Negative " Negative           ASSESSMENT AND PLAN    Assessment & Plan     Diagnoses and all orders for this visit:    1. RUQ pain (Primary)  -     Case Request; Standing  -     Case Request    2. Abnormal CT of the abdomen  Comments:  chronic  Orders:  -     Case Request; Standing  -     Case Request    Other orders  -     Implement Anesthesia Orders Day of Procedure; Standing  -     Obtain Informed Consent; Standing      The pain has resolved. Differential diagnoses discussed.  I would recommend continue ppi daily.          I would recommend egd due to the gastric thickening noted on ct. This is chronic. Differential diagnoses discussed.  He is agreeable to have egd.         ESOPHAGOGASTRODUODENOSCOPY WITH ANESTHESIA (N/A)  Risk, benefits, and alternatives of endoscopy were explained in full.  They understand that there is a risk of bleeding, perforation, and infection.  The risk of perforation goes up with esophageal dilation.  Other options to evaluate UGI complaints could involve barium swallow or UGI series, but these would be diagnostic tests only.  Patient was given time to ask questions.  I answered them to their satisfaction and they are agreeable to proceeding         No follow-ups on file.          There are no Patient Instructions on file for this visit.      CABRERA Almendarez

## 2024-08-07 ENCOUNTER — TELEPHONE (OUTPATIENT)
Dept: GASTROENTEROLOGY | Facility: CLINIC | Age: 69
End: 2024-08-07

## 2024-08-07 NOTE — TELEPHONE ENCOUNTER
Caller: Dereje Salazar    Relationship to patient: Self    Best call back number: [phone]     Patient is needing: PATIENT NEEDS TO CANCEL HIS EGD PROCEDURE SCHEDULED FOR 8/22/24.  HE WILL CALL BACK TO RESCHEDULE ONCE HE SEES HIS CARDIOLOGIST AND IS CLEARED.  THANK YOU.

## 2024-08-20 ENCOUNTER — OFFICE VISIT (OUTPATIENT)
Dept: CARDIOLOGY | Facility: CLINIC | Age: 69
End: 2024-08-20
Payer: MEDICARE

## 2024-08-20 VITALS
BODY MASS INDEX: 26.22 KG/M2 | HEART RATE: 74 BPM | HEIGHT: 69 IN | SYSTOLIC BLOOD PRESSURE: 132 MMHG | WEIGHT: 177 LBS | DIASTOLIC BLOOD PRESSURE: 68 MMHG

## 2024-08-20 DIAGNOSIS — E78.49 OTHER HYPERLIPIDEMIA: ICD-10-CM

## 2024-08-20 DIAGNOSIS — R07.89 CHEST PAIN, ATYPICAL: Primary | ICD-10-CM

## 2024-08-20 DIAGNOSIS — I10 ESSENTIAL HYPERTENSION: ICD-10-CM

## 2024-08-20 DIAGNOSIS — I20.89 ANGINA AT REST: ICD-10-CM

## 2024-08-20 PROCEDURE — 3075F SYST BP GE 130 - 139MM HG: CPT | Performed by: EMERGENCY MEDICINE

## 2024-08-20 PROCEDURE — 99214 OFFICE O/P EST MOD 30 MIN: CPT | Performed by: EMERGENCY MEDICINE

## 2024-08-20 PROCEDURE — 3078F DIAST BP <80 MM HG: CPT | Performed by: EMERGENCY MEDICINE

## 2024-08-20 RX ORDER — FERROUS SULFATE 325(65) MG
1 TABLET ORAL DAILY
COMMUNITY
Start: 2024-08-14

## 2024-08-20 RX ORDER — DILTIAZEM HYDROCHLORIDE 180 MG/1
180 CAPSULE, COATED, EXTENDED RELEASE ORAL DAILY
Qty: 90 CAPSULE | Refills: 3 | Status: SHIPPED | OUTPATIENT
Start: 2024-08-20

## 2024-08-20 RX ORDER — DILTIAZEM HYDROCHLORIDE 180 MG/1
180 CAPSULE, COATED, EXTENDED RELEASE ORAL DAILY
Qty: 90 CAPSULE | Refills: 3 | Status: SHIPPED | OUTPATIENT
Start: 2024-08-20 | End: 2024-08-20

## 2024-08-24 PROBLEM — R07.89 CHEST PAIN, ATYPICAL: Status: ACTIVE | Noted: 2024-08-24

## 2024-08-24 PROCEDURE — 93000 ELECTROCARDIOGRAM COMPLETE: CPT | Performed by: EMERGENCY MEDICINE

## 2024-08-24 NOTE — PROGRESS NOTES
Subjective:     Encounter Date:08/20/2024      Patient ID: Dereje Salazar is a 69 y.o. male.    Chief Complaint:  History of Present Illness  History of Present Illness  The patient presents for evaluation of chest pain.    He reports experiencing sharp, shooting chest pains that have recently become more frequent and slightly less intense. These pains typically occur when he is fatigued or exerting himself, and they often leave him feeling extremely tired. The onset of these pains can sometimes be in the morning, but they are not a constant presence.    His current medication regimen includes daily baby aspirin, Lipitor 40 mg, HCTZ, and losartan. His cholesterol levels have been monitored and reported as normal. He is scheduled for blood work tomorrow. He has a home blood pressure monitor, which typically shows normal readings, so he does not regularly use it. He notes that his blood pressure is usually lower in the mornings, around 120/70. He requested refills for all his medications in June 2024.    In 2021, he underwent a heart catheterization procedure.        Review of Systems   Constitutional: Negative for diaphoresis, fever and malaise/fatigue.   HENT:  Negative for congestion.    Eyes:  Negative for vision loss in left eye and vision loss in right eye.   Cardiovascular:  Positive for chest pain. Negative for claudication, dyspnea on exertion, irregular heartbeat, leg swelling, orthopnea, palpitations and syncope.   Respiratory:  Negative for cough, shortness of breath and wheezing.    Hematologic/Lymphatic: Negative for adenopathy.   Skin:  Negative for rash.   Musculoskeletal:  Negative for joint pain and joint swelling.   Gastrointestinal:  Negative for abdominal pain, diarrhea, nausea and vomiting.   Neurological:  Negative for excessive daytime sleepiness, dizziness, focal weakness, light-headedness, numbness and weakness.   Psychiatric/Behavioral:  Negative for depression. The patient does not have  insomnia.            Current Outpatient Medications:     aspirin 81 MG EC tablet, Take 1 tablet by mouth Daily., Disp: , Rfl:     atorvastatin (LIPITOR) 40 MG tablet, Take 1 tablet by mouth once daily, Disp: 90 tablet, Rfl: 3    cetirizine (zyrTEC) 10 MG tablet, Take 1 tablet by mouth Daily., Disp: , Rfl:     clotrimazole-betamethasone (LOTRISONE) 1-0.05 % cream, Apply 1 application  topically to the appropriate area as directed 2 (Two) Times a Day. Twice daily x two weeks, Disp: 45 g, Rfl: 5    dilTIAZem CD (Cardizem CD) 180 MG 24 hr capsule, Take 1 capsule by mouth Daily., Disp: 90 capsule, Rfl: 3    FeroSul 325 (65 Fe) MG tablet, Take 1 tablet by mouth Daily., Disp: , Rfl:     fluticasone (FLONASE) 50 MCG/ACT nasal spray, 2 sprays into the nostril(s) as directed by provider Daily., Disp: , Rfl:     hydroCHLOROthiazide 25 MG tablet, Take 1 tablet by mouth once daily, Disp: 90 tablet, Rfl: 3    HYDROcodone-acetaminophen (NORCO) 7.5-325 MG per tablet, , Disp: , Rfl:     losartan (COZAAR) 100 MG tablet, Take 1 tablet by mouth once daily, Disp: 90 tablet, Rfl: 3    Multiple Vitamins-Minerals (CENTRUM SILVER PO), Take  by mouth., Disp: , Rfl:     nitroglycerin (NITROSTAT) 0.4 MG SL tablet, 1 under the tongue as needed for angina, may repeat q5mins for up three doses, Disp: 30 tablet, Rfl: 1    nystatin (MYCOSTATIN) 419328 UNIT/GM cream, Apply 1 Application topically to the appropriate area as directed 2 (Two) Times a Day., Disp: 30 g, Rfl: 5    pantoprazole (PROTONIX) 40 MG EC tablet, TK 1 T PO Q MORNING, Disp: , Rfl: 3    POTASSIUM CHLORIDE PO, Take  by mouth., Disp: , Rfl:     amoxicillin-clavulanate (AUGMENTIN) 875-125 MG per tablet, Take 1 tablet by mouth Every 12 (Twelve) Hours., Disp: , Rfl:        Objective:      Vitals:    08/20/24 1325   BP: 132/68   Pulse: 74     Vitals and nursing note reviewed.   Constitutional:       Appearance: Normal and healthy appearance. Well-developed and not in distress.   Eyes:       Extraocular Movements: Extraocular movements intact.      Pupils: Pupils are equal, round, and reactive to light.   HENT:      Head: Normocephalic and atraumatic.    Mouth/Throat:      Pharynx: Oropharynx is clear.   Neck:      Vascular: JVD normal.      Trachea: Trachea normal.   Pulmonary:      Effort: Pulmonary effort is normal.      Breath sounds: Normal breath sounds. No wheezing. No rhonchi. No rales.   Cardiovascular:      PMI at left midclavicular line. Normal rate. Regular rhythm. Normal S1. Normal S2.       Murmurs: There is a grade 2/6 systolic murmur.      No gallop.  No click. No rub.   Pulses:     Dorsalis pedis: 2+ bilaterally.     Posterior tibial: 2+ bilaterally.  Abdominal:      General: Bowel sounds are normal.      Palpations: Abdomen is soft.      Tenderness: There is no abdominal tenderness.   Musculoskeletal: Normal range of motion.      Cervical back: Normal range of motion and neck supple. Skin:     General: Skin is warm and dry.      Capillary Refill: Capillary refill takes less than 2 seconds.   Feet:      Right foot:      Skin integrity: Skin integrity normal.      Left foot:      Skin integrity: Skin integrity normal.   Neurological:      Mental Status: Alert and oriented to person, place and time.      Sensory: Sensation is intact.      Motor: Motor function is intact.      Coordination: Coordination is intact.   Psychiatric:         Speech: Speech normal.         Behavior: Behavior is cooperative.       Physical Exam  Vital Signs  Blood pressure is 132/68, heart rate is 74.    Lab Review:         ECG 12 Lead    Date/Time: 8/24/2024 3:32 PM  Performed by: Finn Mustafa DO    Authorized by: Finn Mustafa DO  Comparison: compared with previous ECG   Similar to previous ECG  Rhythm: sinus rhythm  Rate: normal  Conduction: 1st degree AV block  QRS axis: normal    Clinical impression: abnormal EKG        Results  Testing  EKG shows no new blockages since last check  3 years ago.    Assessment/Plan:     Problem List Items Addressed This Visit       Essential hypertension    Relevant Medications    dilTIAZem CD (Cardizem CD) 180 MG 24 hr capsule    Other hyperlipidemia    Angina at rest    Relevant Medications    dilTIAZem CD (Cardizem CD) 180 MG 24 hr capsule    Chest pain, atypical - Primary    Relevant Orders    ECG 12 Lead    Stress Test With Myocardial Perfusion (1 Day)     Assessment & Plan  1. Chest pain.  The patient's EKG results are satisfactory. He reports experiencing sharp, shooting chest pains that have become duller but more frequent. These pains often occur when he is tired or engaged in activities. A stress test will be conducted to ensure there are no new blockages since the last check three years ago. The dosage of diltiazem will be increased from 120 mg to 180 mg to manage the chest pain. He has been advised to monitor his blood pressure at home and report any instances of dizziness or lightheadedness. If his blood pressure drops significantly, he should contact the office immediately for further evaluation and potential treatment adjustments. If the increased dosage of Cardizem does not alleviate the chest pain after a month, he should inform the office so alternative treatment options can be explored. The prescription for Cardizem 180 mg will be sent to [facility] pharmacy.          Recommendations/plans:    Transcribed from ambient dictation for Finn Mustafa DO by Finn Mustafa DO.  08/24/24   15:31 CDT    Patient or patient representative verbalized consent for the use of Ambient Listening during the visit with  Finn Mustafa DO for chart documentation. 8/24/2024  15:32 CDT

## 2024-08-30 ENCOUNTER — TELEPHONE (OUTPATIENT)
Dept: CARDIOLOGY | Facility: CLINIC | Age: 69
End: 2024-08-30
Payer: MEDICARE

## 2024-08-30 NOTE — TELEPHONE ENCOUNTER
PT CALLED SINCE CHANGING DILTIAZEM TO 180MG BP HAS BEEN RUNNING 117//62 HE WANTS TO KNOW IF THIS IS OK

## 2024-09-27 ENCOUNTER — HOSPITAL ENCOUNTER (OUTPATIENT)
Dept: CARDIOLOGY | Facility: HOSPITAL | Age: 69
Discharge: HOME | End: 2024-09-27
Payer: MEDICARE

## 2024-09-27 VITALS
HEIGHT: 69 IN | SYSTOLIC BLOOD PRESSURE: 145 MMHG | BODY MASS INDEX: 26.66 KG/M2 | HEART RATE: 66 BPM | DIASTOLIC BLOOD PRESSURE: 58 MMHG | WEIGHT: 180 LBS

## 2024-09-27 DIAGNOSIS — R07.89 CHEST PAIN, ATYPICAL: ICD-10-CM

## 2024-09-27 PROCEDURE — A9502 TC99M TETROFOSMIN: HCPCS | Performed by: EMERGENCY MEDICINE

## 2024-09-27 PROCEDURE — 93017 CV STRESS TEST TRACING ONLY: CPT

## 2024-09-27 PROCEDURE — 0 TECHNETIUM TETROFOSMIN KIT: Performed by: EMERGENCY MEDICINE

## 2024-09-27 PROCEDURE — 25010000002 REGADENOSON 0.4 MG/5ML SOLUTION: Performed by: EMERGENCY MEDICINE

## 2024-09-27 PROCEDURE — 78452 HT MUSCLE IMAGE SPECT MULT: CPT

## 2024-09-27 RX ORDER — REGADENOSON 0.08 MG/ML
0.4 INJECTION, SOLUTION INTRAVENOUS ONCE
Status: COMPLETED | OUTPATIENT
Start: 2024-09-27 | End: 2024-09-27

## 2024-09-27 RX ADMIN — TETROFOSMIN 1 DOSE: 1.38 INJECTION, POWDER, LYOPHILIZED, FOR SOLUTION INTRAVENOUS at 10:06

## 2024-09-27 RX ADMIN — TETROFOSMIN 1 DOSE: 1.38 INJECTION, POWDER, LYOPHILIZED, FOR SOLUTION INTRAVENOUS at 11:44

## 2024-09-27 RX ADMIN — REGADENOSON 0.4 MG: 0.08 INJECTION, SOLUTION INTRAVENOUS at 11:00

## 2024-09-29 LAB
BH CV NUCLEAR PRIOR STUDY: 3
BH CV REST NUCLEAR ISOTOPE DOSE: 10.8 MCI
BH CV STRESS BP STAGE 1: NORMAL
BH CV STRESS COMMENTS STAGE 1: NORMAL
BH CV STRESS DOSE REGADENOSON STAGE 1: 0.4
BH CV STRESS DURATION MIN STAGE 1: 0
BH CV STRESS DURATION SEC STAGE 1: 10
BH CV STRESS HR STAGE 1: 95
BH CV STRESS NUCLEAR ISOTOPE DOSE: 34.9 MCI
BH CV STRESS PROTOCOL 1: NORMAL
BH CV STRESS RECOVERY BP: NORMAL MMHG
BH CV STRESS RECOVERY HR: 85 BPM
BH CV STRESS STAGE 1: 1
MAXIMAL PREDICTED HEART RATE: 149 BPM
PERCENT MAX PREDICTED HR: 63.76 %
STRESS BASELINE BP: NORMAL MMHG
STRESS BASELINE HR: 66 BPM
STRESS PERCENT HR: 75 %
STRESS POST EXERCISE DUR MIN: 0 MIN
STRESS POST EXERCISE DUR SEC: 10 SEC
STRESS POST PEAK BP: NORMAL MMHG
STRESS POST PEAK HR: 95 BPM
STRESS TARGET HR: 127 BPM

## 2024-10-01 DIAGNOSIS — R94.39 ABNORMAL STRESS TEST: Primary | ICD-10-CM

## 2024-10-08 ENCOUNTER — NURSE TRIAGE (OUTPATIENT)
Dept: CALL CENTER | Facility: HOSPITAL | Age: 69
End: 2024-10-08
Payer: MEDICARE

## 2024-10-08 ENCOUNTER — TELEPHONE (OUTPATIENT)
Dept: CARDIOLOGY | Facility: CLINIC | Age: 69
End: 2024-10-08

## 2024-10-08 ENCOUNTER — HOSPITAL ENCOUNTER (OUTPATIENT)
Facility: HOSPITAL | Age: 69
Setting detail: HOSPITAL OUTPATIENT SURGERY
Discharge: HOME | End: 2024-10-08
Attending: EMERGENCY MEDICINE | Admitting: EMERGENCY MEDICINE
Payer: MEDICARE

## 2024-10-08 VITALS
SYSTOLIC BLOOD PRESSURE: 107 MMHG | WEIGHT: 178.6 LBS | TEMPERATURE: 97 F | RESPIRATION RATE: 15 BRPM | OXYGEN SATURATION: 99 % | DIASTOLIC BLOOD PRESSURE: 70 MMHG | HEART RATE: 60 BPM | HEIGHT: 69 IN | BODY MASS INDEX: 26.45 KG/M2

## 2024-10-08 DIAGNOSIS — R94.39 ABNORMAL STRESS TEST: ICD-10-CM

## 2024-10-08 LAB
ANION GAP SERPL CALCULATED.3IONS-SCNC: 13 MMOL/L (ref 5–15)
BUN SERPL-MCNC: 11 MG/DL (ref 8–23)
BUN/CREAT SERPL: 16.4 (ref 7–25)
CALCIUM SPEC-SCNC: 9.7 MG/DL (ref 8.6–10.5)
CHLORIDE SERPL-SCNC: 96 MMOL/L (ref 98–107)
CO2 SERPL-SCNC: 26 MMOL/L (ref 22–29)
CREAT SERPL-MCNC: 0.67 MG/DL (ref 0.76–1.27)
DEPRECATED RDW RBC AUTO: 44.4 FL (ref 37–54)
EGFRCR SERPLBLD CKD-EPI 2021: 99.8 ML/MIN/1.73
ERYTHROCYTE [DISTWIDTH] IN BLOOD BY AUTOMATED COUNT: 12.9 % (ref 12.3–15.4)
GLUCOSE SERPL-MCNC: 109 MG/DL (ref 65–99)
HCT VFR BLD AUTO: 41.3 % (ref 37.5–51)
HGB BLD-MCNC: 14.2 G/DL (ref 13–17.7)
INR PPP: 0.91 (ref 0.91–1.09)
MCH RBC QN AUTO: 32.5 PG (ref 26.6–33)
MCHC RBC AUTO-ENTMCNC: 34.4 G/DL (ref 31.5–35.7)
MCV RBC AUTO: 94.5 FL (ref 79–97)
PLATELET # BLD AUTO: 242 10*3/MM3 (ref 140–450)
PMV BLD AUTO: 9.1 FL (ref 6–12)
POTASSIUM SERPL-SCNC: 3.6 MMOL/L (ref 3.5–5.2)
PROTHROMBIN TIME: 12.6 SECONDS (ref 11.8–14.8)
RBC # BLD AUTO: 4.37 10*6/MM3 (ref 4.14–5.8)
SODIUM SERPL-SCNC: 135 MMOL/L (ref 136–145)
WBC NRBC COR # BLD AUTO: 7.61 10*3/MM3 (ref 3.4–10.8)

## 2024-10-08 PROCEDURE — 25510000001 IOPAMIDOL PER 1 ML: Performed by: EMERGENCY MEDICINE

## 2024-10-08 PROCEDURE — 25010000002 LIDOCAINE 2% SOLUTION: Performed by: EMERGENCY MEDICINE

## 2024-10-08 PROCEDURE — 25010000002 FENTANYL CITRATE (PF) 50 MCG/ML SOLUTION: Performed by: EMERGENCY MEDICINE

## 2024-10-08 PROCEDURE — C1894 INTRO/SHEATH, NON-LASER: HCPCS | Performed by: EMERGENCY MEDICINE

## 2024-10-08 PROCEDURE — S0260 H&P FOR SURGERY: HCPCS | Performed by: EMERGENCY MEDICINE

## 2024-10-08 PROCEDURE — 25010000002 DIPHENHYDRAMINE PER 50 MG: Performed by: EMERGENCY MEDICINE

## 2024-10-08 PROCEDURE — 25010000002 MIDAZOLAM HCL (PF) 5 MG/5ML SOLUTION: Performed by: EMERGENCY MEDICINE

## 2024-10-08 PROCEDURE — 93458 L HRT ARTERY/VENTRICLE ANGIO: CPT | Performed by: EMERGENCY MEDICINE

## 2024-10-08 PROCEDURE — C1760 CLOSURE DEV, VASC: HCPCS | Performed by: EMERGENCY MEDICINE

## 2024-10-08 PROCEDURE — 99152 MOD SED SAME PHYS/QHP 5/>YRS: CPT | Performed by: EMERGENCY MEDICINE

## 2024-10-08 PROCEDURE — C1769 GUIDE WIRE: HCPCS | Performed by: EMERGENCY MEDICINE

## 2024-10-08 PROCEDURE — 85027 COMPLETE CBC AUTOMATED: CPT | Performed by: EMERGENCY MEDICINE

## 2024-10-08 PROCEDURE — 85610 PROTHROMBIN TIME: CPT | Performed by: EMERGENCY MEDICINE

## 2024-10-08 PROCEDURE — 80048 BASIC METABOLIC PNL TOTAL CA: CPT | Performed by: EMERGENCY MEDICINE

## 2024-10-08 RX ORDER — IOPAMIDOL 755 MG/ML
INJECTION, SOLUTION INTRAVASCULAR
Status: DISCONTINUED | OUTPATIENT
Start: 2024-10-08 | End: 2024-10-08 | Stop reason: HOSPADM

## 2024-10-08 RX ORDER — FENTANYL CITRATE 50 UG/ML
INJECTION, SOLUTION INTRAMUSCULAR; INTRAVENOUS
Status: DISCONTINUED | OUTPATIENT
Start: 2024-10-08 | End: 2024-10-08 | Stop reason: HOSPADM

## 2024-10-08 RX ORDER — SODIUM CHLORIDE 9 MG/ML
1 INJECTION, SOLUTION INTRAVENOUS CONTINUOUS
Status: DISCONTINUED | OUTPATIENT
Start: 2024-10-08 | End: 2024-10-08 | Stop reason: HOSPADM

## 2024-10-08 RX ORDER — NITROGLYCERIN 0.4 MG/1
0.4 TABLET SUBLINGUAL
Status: DISCONTINUED | OUTPATIENT
Start: 2024-10-08 | End: 2024-10-08 | Stop reason: HOSPADM

## 2024-10-08 RX ORDER — MIDAZOLAM HYDROCHLORIDE 5 MG/5ML
INJECTION, SOLUTION INTRAMUSCULAR; INTRAVENOUS
Status: DISCONTINUED | OUTPATIENT
Start: 2024-10-08 | End: 2024-10-08 | Stop reason: HOSPADM

## 2024-10-08 RX ORDER — SODIUM CHLORIDE 0.9 % (FLUSH) 0.9 %
10 SYRINGE (ML) INJECTION AS NEEDED
Status: DISCONTINUED | OUTPATIENT
Start: 2024-10-08 | End: 2024-10-08 | Stop reason: HOSPADM

## 2024-10-08 RX ORDER — RANOLAZINE 500 MG/1
500 TABLET, EXTENDED RELEASE ORAL 2 TIMES DAILY
Qty: 60 TABLET | Refills: 11 | Status: SHIPPED | OUTPATIENT
Start: 2024-10-08

## 2024-10-08 RX ORDER — LIDOCAINE HYDROCHLORIDE 20 MG/ML
INJECTION, SOLUTION INFILTRATION; PERINEURAL
Status: DISCONTINUED | OUTPATIENT
Start: 2024-10-08 | End: 2024-10-08 | Stop reason: HOSPADM

## 2024-10-08 RX ORDER — ACETAMINOPHEN 325 MG/1
650 TABLET ORAL EVERY 4 HOURS PRN
Status: DISCONTINUED | OUTPATIENT
Start: 2024-10-08 | End: 2024-10-08 | Stop reason: HOSPADM

## 2024-10-08 RX ORDER — SODIUM CHLORIDE 0.9 % (FLUSH) 0.9 %
10 SYRINGE (ML) INJECTION EVERY 12 HOURS SCHEDULED
Status: DISCONTINUED | OUTPATIENT
Start: 2024-10-08 | End: 2024-10-08 | Stop reason: HOSPADM

## 2024-10-08 RX ORDER — DIPHENHYDRAMINE HYDROCHLORIDE 50 MG/ML
INJECTION INTRAMUSCULAR; INTRAVENOUS
Status: DISCONTINUED | OUTPATIENT
Start: 2024-10-08 | End: 2024-10-08 | Stop reason: HOSPADM

## 2024-10-08 NOTE — TELEPHONE ENCOUNTER
Hub staff attempted to follow warm transfer process and was unsuccessful     Caller: Dereje Salazar    Relationship to patient: Self    Best call back number: [phone]     Patient is needing: PATIENT HAD A CATH PERFORMED ON 10.08.24. HE NOW HAS A LIGHT BLUE SPOT ON HIS LEG FROM HIS CATH AREA DOWN TO HIS THIGH.

## 2024-10-08 NOTE — H&P
Patient seen and examined at bedside.  The history and physical has not changed as below.    We will be performing a diagnostic left heart catheterization today with possible intervention based on findings.    Risk, benefits and alternatives were explained to the patient and he wished to proceed.    Subjective:      Encounter Date:08/20/2024        Subjective  Patient ID: Dereje Salazar is a 69 y.o. male.     Chief Complaint:  History of Present Illness  History of Present Illness  The patient presents for evaluation of chest pain.     He reports experiencing sharp, shooting chest pains that have recently become more frequent and slightly less intense. These pains typically occur when he is fatigued or exerting himself, and they often leave him feeling extremely tired. The onset of these pains can sometimes be in the morning, but they are not a constant presence.     His current medication regimen includes daily baby aspirin, Lipitor 40 mg, HCTZ, and losartan. His cholesterol levels have been monitored and reported as normal. He is scheduled for blood work tomorrow. He has a home blood pressure monitor, which typically shows normal readings, so he does not regularly use it. He notes that his blood pressure is usually lower in the mornings, around 120/70. He requested refills for all his medications in June 2024.     In 2021, he underwent a heart catheterization procedure.           Review of Systems   Constitutional: Negative for diaphoresis, fever and malaise/fatigue.   HENT:  Negative for congestion.    Eyes:  Negative for vision loss in left eye and vision loss in right eye.   Cardiovascular:  Positive for chest pain. Negative for claudication, dyspnea on exertion, irregular heartbeat, leg swelling, orthopnea, palpitations and syncope.   Respiratory:  Negative for cough, shortness of breath and wheezing.    Hematologic/Lymphatic: Negative for adenopathy.   Skin:  Negative for rash.   Musculoskeletal:  Negative for  joint pain and joint swelling.   Gastrointestinal:  Negative for abdominal pain, diarrhea, nausea and vomiting.   Neurological:  Negative for excessive daytime sleepiness, dizziness, focal weakness, light-headedness, numbness and weakness.   Psychiatric/Behavioral:  Negative for depression. The patient does not have insomnia.               Current Medications      Current Outpatient Medications:     aspirin 81 MG EC tablet, Take 1 tablet by mouth Daily., Disp: , Rfl:     atorvastatin (LIPITOR) 40 MG tablet, Take 1 tablet by mouth once daily, Disp: 90 tablet, Rfl: 3    cetirizine (zyrTEC) 10 MG tablet, Take 1 tablet by mouth Daily., Disp: , Rfl:     clotrimazole-betamethasone (LOTRISONE) 1-0.05 % cream, Apply 1 application  topically to the appropriate area as directed 2 (Two) Times a Day. Twice daily x two weeks, Disp: 45 g, Rfl: 5    dilTIAZem CD (Cardizem CD) 180 MG 24 hr capsule, Take 1 capsule by mouth Daily., Disp: 90 capsule, Rfl: 3    FeroSul 325 (65 Fe) MG tablet, Take 1 tablet by mouth Daily., Disp: , Rfl:     fluticasone (FLONASE) 50 MCG/ACT nasal spray, 2 sprays into the nostril(s) as directed by provider Daily., Disp: , Rfl:     hydroCHLOROthiazide 25 MG tablet, Take 1 tablet by mouth once daily, Disp: 90 tablet, Rfl: 3    HYDROcodone-acetaminophen (NORCO) 7.5-325 MG per tablet, , Disp: , Rfl:     losartan (COZAAR) 100 MG tablet, Take 1 tablet by mouth once daily, Disp: 90 tablet, Rfl: 3    Multiple Vitamins-Minerals (CENTRUM SILVER PO), Take  by mouth., Disp: , Rfl:     nitroglycerin (NITROSTAT) 0.4 MG SL tablet, 1 under the tongue as needed for angina, may repeat q5mins for up three doses, Disp: 30 tablet, Rfl: 1    nystatin (MYCOSTATIN) 132564 UNIT/GM cream, Apply 1 Application topically to the appropriate area as directed 2 (Two) Times a Day., Disp: 30 g, Rfl: 5    pantoprazole (PROTONIX) 40 MG EC tablet, TK 1 T PO Q MORNING, Disp: , Rfl: 3    POTASSIUM CHLORIDE PO, Take  by mouth., Disp: , Rfl:      amoxicillin-clavulanate (AUGMENTIN) 875-125 MG per tablet, Take 1 tablet by mouth Every 12 (Twelve) Hours., Disp: , Rfl:               Objective:      Objective      Vitals:     08/20/24 1325   BP: 132/68   Pulse: 74      Vitals and nursing note reviewed.   Constitutional:       Appearance: Normal and healthy appearance. Well-developed and not in distress.   Eyes:      Extraocular Movements: Extraocular movements intact.      Pupils: Pupils are equal, round, and reactive to light.   HENT:      Head: Normocephalic and atraumatic.    Mouth/Throat:      Pharynx: Oropharynx is clear.   Neck:      Vascular: JVD normal.      Trachea: Trachea normal.   Pulmonary:      Effort: Pulmonary effort is normal.      Breath sounds: Normal breath sounds. No wheezing. No rhonchi. No rales.   Cardiovascular:      PMI at left midclavicular line. Normal rate. Regular rhythm. Normal S1. Normal S2.       Murmurs: There is a grade 2/6 systolic murmur.      No gallop.  No click. No rub.   Pulses:     Dorsalis pedis: 2+ bilaterally.     Posterior tibial: 2+ bilaterally.  Abdominal:      General: Bowel sounds are normal.      Palpations: Abdomen is soft.      Tenderness: There is no abdominal tenderness.   Musculoskeletal: Normal range of motion.      Cervical back: Normal range of motion and neck supple. Skin:     General: Skin is warm and dry.      Capillary Refill: Capillary refill takes less than 2 seconds.   Feet:      Right foot:      Skin integrity: Skin integrity normal.      Left foot:      Skin integrity: Skin integrity normal.   Neurological:      Mental Status: Alert and oriented to person, place and time.      Sensory: Sensation is intact.      Motor: Motor function is intact.      Coordination: Coordination is intact.   Psychiatric:         Speech: Speech normal.         Behavior: Behavior is cooperative.         Physical Exam  Vital Signs  Blood pressure is 132/68, heart rate is 74.     Lab Review:            ECG 12 Lead      Date/Time: 8/24/2024 3:32 PM  Performed by: Finn Mustafa DO     Authorized by: Finn Mustafa DO  Comparison: compared with previous ECG   Similar to previous ECG  Rhythm: sinus rhythm  Rate: normal  Conduction: 1st degree AV block  QRS axis: normal     Clinical impression: abnormal EKG           Results  Testing  EKG shows no new blockages since last check 3 years ago.     Assessment/Plan:      Problem List Items Addressed This Visit         Essential hypertension     Relevant Medications     dilTIAZem CD (Cardizem CD) 180 MG 24 hr capsule     Other hyperlipidemia     Angina at rest     Relevant Medications     dilTIAZem CD (Cardizem CD) 180 MG 24 hr capsule     Chest pain, atypical - Primary     Relevant Orders     ECG 12 Lead     Stress Test With Myocardial Perfusion (1 Day)      Assessment & Plan  1. Chest pain.  The patient's EKG results are satisfactory. He reports experiencing sharp, shooting chest pains that have become duller but more frequent. These pains often occur when he is tired or engaged in activities. A stress test will be conducted to ensure there are no new blockages since the last check three years ago. The dosage of diltiazem will be increased from 120 mg to 180 mg to manage the chest pain. He has been advised to monitor his blood pressure at home and report any instances of dizziness or lightheadedness. If his blood pressure drops significantly, he should contact the office immediately for further evaluation and potential treatment adjustments. If the increased dosage of Cardizem does not alleviate the chest pain after a month, he should inform the office so alternative treatment options can be explored. The prescription for Cardizem 180 mg will be sent to [facility] pharmacy.              Recommendations/plans:     Transcribed from ambient dictation for Finn Mustafa DO by Finn Mustafa DO.  08/24/24   15:31 CDT     Patient or patient representative  verbalized consent for the use of Ambient Listening during the visit with  Finn Mustafa DO for chart documentation. 8/24/2024  15:32 CDT

## 2024-10-08 NOTE — OP NOTE
Date of Procedure: October 8, 2024     Procedures Performed:     1.  Attempted access to the right ulnar artery via modified Seldinger technique and ultrasound guidance, aborted due to inability to advance equipment  2.  Left heart catheterization with retrograde crossing the aortic valve into the left ventricle where pressures were recorded  3.  LV ventriculography  4.  Selective bilateral coronary angiography  5.  Patent hemostasis achieved in the right femoral artery access site using 6 Dutch Angio-Seal closure device  6.  Conscious sedation with continuous hemodynamic monitoring for 20 minutes  7.  Access to the right femoral artery via modified Seldinger technique and ultrasound guidance             Risk, Benefits, and Alternatives discussed with the patient and/or family.  Plan is for moderate sedation, and the patient agrees to proceed with the procedure.  An immediate assessment was done prior to the administration of moderate sedation.     Indication: abnormal stress test with evidence of possible high degree stenosis in the right coronary artery     Access: Right femoral  Diagnostic Catheters: TIG, pigtail     Procedural Details:     After written and informed consent was obtained, the patient was brought to the cardiac catheterization lab in a fasting state. The skin overlying the patient's right wrist was prepped and draped in the usual sterile fashion. Timeout was taken to confirm the correct patient and procedure. IV Versed and fentanyl were used to achieve conscious sedation. Lidocaine was administered for local anesthesia over the right ulnar artery.  Attempted to obtain access in the right ulnar artery but this was aborted due to inability to pass equipment effectively.    Next, access was obtained in the right femoral artery via modified Seldinger technique and ultrasound guidance.  A 6 Dutch sheath was placed into the artery and flushed.  Next a JL 4 catheter was inserted and advanced and used  to engage the left main coronary artery.  Selective left coronary angiography was performed multiple views.  This catheter was exchanged for a J R4 which was used to engage the right..  Selective right coronary angiography was performed in multiple views.  This catheter was then exchanged for a pigtail catheter which was used to cross the aortic valve into the left ventricle where pressures were recorded.  LV ventriculography was then performed in the MCDANIEL projection.  This catheter was drawn back across the aortic valve into the ascending aorta and again pressures were recorded.  Everything was withdrawn through the sheath and the sheath was flushed.  Patient tolerated the procedure well without any complications.  Patent hemostasis was achieved in the right femoral access site using a 6 Vietnamese Angio-Seal closure device.  The patient tolerated the procedure well without any complications.  He left the Cath Lab in stable condition and will return to post cardiac cath recovery area for close monitoring before discharge home.     During the procedure, I supervised the administration and monitoring of conscious sedation.  This included monitoring of the patient's overall status, hemodynamics and oximetry.  The patient received the first dose of IV sedation at 1124 and I left the room at 1146, accounting for a total of 20 minutes of face-to-face encounter time with the patient in the cath lab today.     Results:  Hemodynamics:  Aorta: 107/53 mmHg  LV: 105/1 mmHg  LVEDP: 12 mmHg     Selective Coronary Angiography:     Left Main Coronary Artery: Left main is a very large, short caliber vessel that bifurcates into the LAD and left circumflex arteries, no angiographic evidence of stenosis, SHANTEL-3 flow    LAD: The LAD is a very large caliber vessel that has no angiographic evidence of stenosis.  There is significant myocardial bridging noted in the mid segment, SHANTEL-3 flow    Diagonals: Moderate caliber vessels with no  significant disease    Left circumflex: Left circumflex is a large-caliber vessel that has no angiographic evidence of stenosis, SHANTEL-3 flow    Obtuse marginals: First OM is a moderate caliber vessel with no significant disease, the second OM is a large caliber vessel with no significant disease    RCA: Right coronary artery is a very large caliber vessel with no angiographic evidence of stenosis, SHANTEL-3 flow    PDA/MILAD: The MILAD is a large-caliber vessel with no significant disease, the PDA is a moderate caliber vessel with no significant disease and is very tortuous           Total contrast: 60 cc     Fluoroscopy time: 0.8 minutes      X-ray exposure: 153 mGy     Estimated Blood Loss: 5 cc     Specimens: None     Complications: None     Impression:  1.  No significant epicardial coronary artery disease as described above  2.  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  Hypertension  4.  Hyperlipidemia  5.  Nicotine abuse     Plan:  1.  Monitor post cardiac cath recovery with plans for discharge home later today  2.  Continue on current cardiac regimen without change  3.  Follow-up with cardiology in 6 months              Finn Mustafa DO  Interventional cardiology  [facility]  October 8, 2024

## 2024-10-08 NOTE — TELEPHONE ENCOUNTER
"Heart cath today Dr Mustafa.   Bruising down right side from Waist to knee, bruising noticed after he got home. Did not notice large amount of bruising when he dressed at discharge.  No stents placed.  No bleeding to right groin.  Care advice given, to be evaluated at the ED        Reason for Disposition   Sounds like a serious complication to the triager    Additional Information   Negative: Sounds like a life-threatening emergency to the triager   Negative: Chest pain   Negative: Difficulty breathing   Negative: Acting confused (e.g., disoriented, slurred speech) or excessively sleepy   Negative: Post-Op tonsil and adenoid surgery, symptoms or questions about   Negative: Surgical incision symptoms and questions   Negative: [1] Pain or burning with passing urine (urination) AND [2] male   Negative: [1] Pain or burning with passing urine (urination) AND [2] female   Negative: Constipation   Negative: New or worsening leg (calf, thigh) pain   Negative: New or worsening leg swelling   Negative: Dizziness is severe, or persists > 24 hours after surgery   Negative: Pain, redness, swelling, or pus at IV Site   Negative: Symptoms arising from use of a urinary catheter (e.g., Coude, Bermeo)   Negative: Cast problems or questions   Negative: Medication question   Negative: [1] Widespread rash AND [2] bright red, sunburn-like   Negative: [1] SEVERE headache AND [2] after spinal (epidural) anesthesia   Negative: [1] Vomiting AND [2] persists > 4 hours   Negative: [1] Vomiting AND [2] abdomen looks much more swollen than usual   Negative: [1] Drinking very little AND [2] dehydration suspected (e.g., no urine > 12 hours, very dry mouth, very lightheaded)   Negative: Patient sounds very sick or weak to the triager    Answer Assessment - Initial Assessment Questions  1. SYMPTOM: \"What's the main symptom you're concerned about?\" (e.g., pain, fever, vomiting)     Bruising to waist to knee  2. ONSET: \"When did *No Answer*  start?\"   " "   Noticed when he got home and changed clothes  3. SURGERY: \"What surgery did you have?\"     Heart cath  4. DATE of SURGERY: \"When was the surgery?\"       10/07/2024  5. ANESTHESIA: \" What type of anesthesia did you have?\" (e.g., general, spinal, epidural, local)   local  6. PAIN: \"Is there any pain?\" If Yes, ask: \"How bad is it?\"  (Scale 1-10; or mild, moderate, severe)  Just sore at insertion site  Denies bleeding  7. FEVER: \"Do you have a fever?\" If Yes, ask: \"What is your temperature, how was it measured, and when did it start?\"    no  8. VOMITING: \"Is there any vomiting?\" If Yes, ask: \"How many times?\"    no vomiting    9. BLEEDING: \"Is there any bleeding?\" If Yes, ask: \"How much?\" and \"Where?\"  Large amount of bruising from waist to knee noticed when he got home.  10. OTHER SYMPTOMS: \"Do you have any other symptoms?\" (e.g., drainage from wound, painful urination, constipation)    Denies bleeding    Protocols used: Post-Op Symptoms and Questions-ADULT-    "

## 2024-10-09 NOTE — TELEPHONE ENCOUNTER
Patient has been called. He reports no pain. Bruising to groin. Advised to monitor, if symptoms do not improve following he will call back.

## 2024-10-14 NOTE — TELEPHONE ENCOUNTER
I called pt to check on him.  He said the bruising has gone away.  He will not need to come in for a site check.  Pasquale Arroyo, CMA

## 2024-10-31 ENCOUNTER — TELEPHONE (OUTPATIENT)
Dept: CARDIOLOGY | Facility: CLINIC | Age: 69
End: 2024-10-31
Payer: MEDICARE

## 2024-10-31 NOTE — TELEPHONE ENCOUNTER
PT CALLED HE WANTS TO KNOW IF THERE IS SOMETHING ELSE HE CAN TAKE BESIDES THE RENEXA.  IT MAKES HIM NAUSEOUS ALL DAY LONG

## 2024-11-01 RX ORDER — ISOSORBIDE MONONITRATE 60 MG/1
60 TABLET, EXTENDED RELEASE ORAL EVERY MORNING
Qty: 30 TABLET | Refills: 11 | Status: SHIPPED | OUTPATIENT
Start: 2024-11-01 | End: 2024-11-04 | Stop reason: SDUPTHER

## 2024-11-04 RX ORDER — ISOSORBIDE MONONITRATE 60 MG/1
60 TABLET, EXTENDED RELEASE ORAL EVERY MORNING
Qty: 30 TABLET | Refills: 11 | Status: SHIPPED | OUTPATIENT
Start: 2024-11-04

## 2024-12-19 ENCOUNTER — OFFICE VISIT (OUTPATIENT)
Dept: UROLOGY | Age: 69
End: 2024-12-19
Payer: MEDICARE

## 2024-12-19 VITALS — WEIGHT: 180 LBS | BODY MASS INDEX: 26.66 KG/M2 | HEIGHT: 69 IN | TEMPERATURE: 98.2 F

## 2024-12-19 DIAGNOSIS — N43.40 SPERMATOCELE: Primary | ICD-10-CM

## 2024-12-19 LAB
APPEARANCE FLUID: CLEAR
BILIRUBIN, POC: NORMAL
BLOOD URINE, POC: NORMAL
CLARITY, POC: CLEAR
COLOR, POC: YELLOW
GLUCOSE URINE, POC: NORMAL MG/DL
KETONES, POC: NORMAL MG/DL
LEUKOCYTE EST, POC: NORMAL
NITRITE, POC: NORMAL
PH, POC: 6.5
PROTEIN, POC: NORMAL MG/DL
SPECIFIC GRAVITY, POC: 1.01
UROBILINOGEN, POC: 0.2 MG/DL

## 2024-12-19 PROCEDURE — 4004F PT TOBACCO SCREEN RCVD TLK: CPT | Performed by: UROLOGY

## 2024-12-19 PROCEDURE — 81002 URINALYSIS NONAUTO W/O SCOPE: CPT | Performed by: UROLOGY

## 2024-12-19 PROCEDURE — G8427 DOCREV CUR MEDS BY ELIG CLIN: HCPCS | Performed by: UROLOGY

## 2024-12-19 PROCEDURE — 1159F MED LIST DOCD IN RCRD: CPT | Performed by: UROLOGY

## 2024-12-19 PROCEDURE — G8419 CALC BMI OUT NRM PARAM NOF/U: HCPCS | Performed by: UROLOGY

## 2024-12-19 PROCEDURE — 1123F ACP DISCUSS/DSCN MKR DOCD: CPT | Performed by: UROLOGY

## 2024-12-19 PROCEDURE — 3017F COLORECTAL CA SCREEN DOC REV: CPT | Performed by: UROLOGY

## 2024-12-19 PROCEDURE — G8484 FLU IMMUNIZE NO ADMIN: HCPCS | Performed by: UROLOGY

## 2024-12-19 PROCEDURE — 99203 OFFICE O/P NEW LOW 30 MIN: CPT | Performed by: UROLOGY

## 2024-12-19 RX ORDER — FERROUS SULFATE 325(65) MG
1 TABLET ORAL DAILY
COMMUNITY

## 2024-12-19 RX ORDER — HYDROCODONE BITARTRATE AND ACETAMINOPHEN 7.5; 325 MG/1; MG/1
1 TABLET ORAL 2 TIMES DAILY PRN
COMMUNITY
Start: 2024-12-12

## 2024-12-19 RX ORDER — POTASSIUM CHLORIDE 750 MG/1
10 TABLET, EXTENDED RELEASE ORAL DAILY
COMMUNITY
Start: 2024-11-16

## 2024-12-19 RX ORDER — LOSARTAN POTASSIUM 100 MG/1
100 TABLET ORAL DAILY
COMMUNITY
Start: 2024-06-17

## 2024-12-19 RX ORDER — ASPIRIN 81 MG/1
81 TABLET ORAL DAILY
COMMUNITY

## 2024-12-19 RX ORDER — DILTIAZEM HYDROCHLORIDE 180 MG/1
180 CAPSULE, COATED, EXTENDED RELEASE ORAL DAILY
COMMUNITY

## 2024-12-19 RX ORDER — PANTOPRAZOLE SODIUM 40 MG/1
40 TABLET, DELAYED RELEASE ORAL 2 TIMES DAILY
COMMUNITY
Start: 2024-12-16

## 2024-12-19 RX ORDER — ATORVASTATIN CALCIUM 40 MG/1
40 TABLET, FILM COATED ORAL DAILY
COMMUNITY
Start: 2024-06-17

## 2024-12-19 RX ORDER — HYDROCHLOROTHIAZIDE 25 MG/1
25 TABLET ORAL DAILY
COMMUNITY
Start: 2024-06-17

## 2024-12-19 RX ORDER — CETIRIZINE HYDROCHLORIDE 10 MG/1
10 TABLET ORAL DAILY
COMMUNITY

## 2024-12-19 ASSESSMENT — ENCOUNTER SYMPTOMS
SHORTNESS OF BREATH: 0
EYE DISCHARGE: 0
NAUSEA: 0
ABDOMINAL PAIN: 0
VOMITING: 0
BACK PAIN: 0
COUGH: 0
TROUBLE SWALLOWING: 0
ABDOMINAL DISTENTION: 0
EYE REDNESS: 0
CONSTIPATION: 0
DIARRHEA: 0

## 2024-12-19 NOTE — PROGRESS NOTES
Bebeto Trujillo is a 69 y.o. male who presents today No chief complaint on file.      {[id number]}    No past medical history on file.    No past surgical history on file.    No current outpatient medications on file.     No current facility-administered medications for this visit.       Not on File    Social History     Socioeconomic History    Marital status:      Social Determinants of Health      Received from [facility]    Family and Community Support    Received from [facility]    Abuse Screen    Received from [facility]    Housing Stability       No family history on file.    REVIEW OF SYSTEMS:  Review of Systems   Constitutional:  Negative for chills, fatigue and fever.   HENT:  Negative for congestion, ear pain and trouble swallowing.    Eyes:  Negative for discharge and redness.   Respiratory:  Negative for cough and shortness of breath.    Cardiovascular:  Negative for chest pain.   Gastrointestinal:  Negative for abdominal distention, abdominal pain, constipation, diarrhea, nausea and vomiting.   Endocrine: Negative for cold intolerance and heat intolerance.   Genitourinary:  Negative for difficulty urinating, dysuria, flank pain, frequency, hematuria and urgency.   Musculoskeletal:  Negative for back pain and gait problem.   Skin:  Negative for pallor and wound.   Allergic/Immunologic: Negative for environmental allergies and immunocompromised state.   Neurological:  Negative for dizziness and weakness.   Hematological:  Negative for adenopathy. Does not bruise/bleed easily.   Psychiatric/Behavioral:  Negative for agitation and confusion.        ***

## 2024-12-19 NOTE — PROGRESS NOTES
Bebeto Trujillo is a 69 y.o. male who presents today   Chief Complaint   Patient presents with    New Patient     I am here today for a scrotal cyst. I used to see Dr. King but I wanted a second opinion.        The patient is a 69-year-old gentleman who presents with a chief complaint of a right scrotal cyst. He comes in for a second opinion, having previously seen Dr. King in 06/2024 and prior to that in 03/2021.     He has a known right epididymal cyst or spermatocele measuring 6.3 x 6.97 cm, which he reports has remained essentially unchanged. This condition causes him minimal discomfort, with occasional aching that is not constant. Dr. King had recommended a watchful waiting approach due to the lack of significant symptoms, but he is now seeking a second opinion. He reports no difficulty with urination, pain, or redness.    Past Medical History:   Diagnosis Date    Colon polyp     Diverticulosis     GERD (gastroesophageal reflux disease)     Heart disorder     Hyperlipemia     Hypertension     IBS (irritable bowel syndrome)        Past Surgical History:   Procedure Laterality Date    CARDIAC CATHETERIZATION  07/15/2021    COLONOSCOPY  2006    COLONOSCOPY  2021    EYE SURGERY      KNEE SURGERY      UPPER ENDOSCOPY W/ SCLEROTHERAPY  2016       Current Outpatient Medications   Medication Sig Dispense Refill    aspirin 81 MG EC tablet Take 1 tablet by mouth daily      atorvastatin (LIPITOR) 40 MG tablet Take 1 tablet by mouth daily      cetirizine (ZYRTEC) 10 MG tablet Take 1 tablet by mouth daily      dilTIAZem (CARDIZEM CD) 180 MG extended release capsule Take 1 capsule by mouth daily      FEROSUL 325 (65 Fe) MG tablet Take 1 tablet by mouth daily      hydroCHLOROthiazide (HYDRODIURIL) 25 MG tablet Take 1 tablet by mouth daily      HYDROcodone-acetaminophen (NORCO) 7.5-325 MG per tablet Take 1 tablet by mouth 2 times daily as needed.      losartan (COZAAR) 100 MG tablet Take 1 tablet by mouth daily      pantoprazole

## 2024-12-19 NOTE — PROGRESS NOTES
Bebeto Trujillo (:  1955) is a 69 y.o. male, New patient, here for evaluation of the following chief complaint(s):  New Patient (I am here today for a scrotal cyst. I used to see Dr. King but I wanted a second opinion. )         Assessment & Plan  1. Large right spermatocele.  The condition appears benign by ultrasound and is minimally symptomatic. A scrotal ultrasound done on 2024, shows a 6.3 x 6.9 cm cyst above the right testicle. On examination, a large approximately 6 cm cystic mass was palpated above the right testicle, extending into the inguinal region, nontender and somewhat tense. Options discussed include continued observation and surgical intervention (spermatocelectomy). The patient reports the condition is not too bothersome at this time. The risks and benefits of surgery, including the risk of recurrence, scrotal hematoma, atrophy of the testicle, and chronic testicular pain, were discussed. The expected postoperative course, including activity limitations for at least 2 weeks and a longer period for the testicle to feel normal, was also explained. The patient has elected to continue with observation and will contact if symptoms worsen or if he decides to pursue surgical intervention.    Results  Imaging  Scrotal ultrasound done in 2024 shows a 6.3 x 6.9 cm cyst above the right testicle.  1. Spermatocele, right  -     POCT Urinalysis no Micro    Return if symptoms worsen or fail to improve.       Subjective   History of Present Illness  The patient is a 69-year-old gentleman who presents with a chief complaint of a right scrotal cyst. He comes in for a second opinion, having previously seen Dr. King in 2024 and prior to that in 2021.    He has a known right epididymal cyst or spermatocele measuring 6.3 x 6.97 cm, which he reports has remained essentially unchanged. This condition causes him minimal discomfort, with occasional aching that is not constant. Dr. King had

## 2024-12-20 ENCOUNTER — OFFICE VISIT (OUTPATIENT)
Dept: GASTROENTEROLOGY | Facility: CLINIC | Age: 69
End: 2024-12-20
Payer: MEDICARE

## 2024-12-20 VITALS
HEART RATE: 50 BPM | BODY MASS INDEX: 26.66 KG/M2 | TEMPERATURE: 97.7 F | DIASTOLIC BLOOD PRESSURE: 60 MMHG | SYSTOLIC BLOOD PRESSURE: 128 MMHG | OXYGEN SATURATION: 100 % | HEIGHT: 69 IN | WEIGHT: 180 LBS

## 2024-12-20 DIAGNOSIS — Z86.0101 HISTORY OF ADENOMATOUS POLYP OF COLON: Primary | ICD-10-CM

## 2024-12-20 DIAGNOSIS — Z83.719 FAMILY HX COLONIC POLYPS: ICD-10-CM

## 2024-12-20 DIAGNOSIS — R10.11 RUQ PAIN: ICD-10-CM

## 2024-12-20 DIAGNOSIS — R93.5 ABNORMAL CT OF THE ABDOMEN: ICD-10-CM

## 2024-12-20 DIAGNOSIS — Z80.0 FAMILY HX OF COLON CANCER: ICD-10-CM

## 2024-12-20 NOTE — PROGRESS NOTES
[facility]    Primary Physician Wilbert Saleem MD    12/20/2024    Dereje Salazar   1955      Chief Complaint   Patient presents with    GI Problem     Personal history of colon polyps, family history of colon polyps   History of abnormal ct     Subjective     HPI    Dereje Salazar is a 69 y.o. male who presents as a referral for preventative maintenance. He has no complaints of nausea or vomiting. No change in bowels. No wt loss. No BRBPR. No melena. No abdominal pain.       I saw him 7/2024 for ruq pain abdominal pain and abnormal ct with gastric thickening. He is no longer having ruq pain. Takes protonix daily.  Denies chest pain. No weight loss. No n/v. No fever.         COLONOSCOPY (11/04/2021 09:19) recall 3 years.   Tissue Pathology Exam (11/04/2021 09:53) hyperplastic.     Father and brother had colon cancer.   Sister had colon polyps.       Cardiac cath 10/2024 Dr. Mustafa.   ==========================================================================  Office visit  7-29-24 HPI  Dereje Salazar is a 69 y.o. male presents  with abdominal pain.  Right upper abdominal pain. This started 4/2024 and lasted 2 mo.  Thinks might have been better after passing gas or bm. Not associated with eating. No further pain.  No triggers. No change in bowel habits or rectal bleeding. No fever. No weight loss. No n/v.         Has taken pantoprazole daily for a few yrs.         IMAGING SCANNED (03/19/2024) ct abdomen/pelvis with and without contrast.   IMAGING SCANNED (05/28/2024) ultrasound abdomen.   IMAGING SCANNED (06/04/2024) HIDA scan            COLONOSCOPY (11/04/2021 09:19) recall 3 years.   Tissue Pathology Exam (11/04/2021 09:53)      UPPER GI ENDOSCOPY (12/16/2016 06:51)     Past Medical History:   Diagnosis Date    Colon polyps     Diverticulosis     GERD (gastroesophageal reflux disease)     Heart disorder     Hyperlipidemia     Hypertension     IBS (irritable bowel syndrome)     Knee pain         Past Surgical History:   Procedure Laterality Date    CARDIAC CATHETERIZATION Right 7/15/2021    Procedure: Left Heart Cath w poss intervention, right radial, US guided;  Surgeon: Finn Mustafa DO;  Location: [facility] CATH INVASIVE LOCATION;  Service: Cardiovascular;  Laterality: Right;    CARDIAC CATHETERIZATION N/A 10/8/2024    Procedure: Left Heart Cath;  Surgeon: Finn Mustafa DO;  Location:  [facility] CATH INVASIVE LOCATION;  Service: Cardiovascular;  Laterality: N/A;    COLONOSCOPY  2006    polyps present    COLONOSCOPY  08/20/2010    clean based ulcerations covering ton lumen @ hepatic flexure, (5) polypectomies, moderate diverticulosis left colon    COLONOSCOPY  11/12/2010    resolved colonic ulcerations, small polypectomies, moderate diverticulosis    COLONOSCOPY  12/31/2013    COLONOSCOPY N/A 12/16/2016    Procedure: COLONOSCOPY WITH ANESTHESIA;  Surgeon: Boris Castañeda MD;  Location: [facility] ENDOSCOPY;  Service:     COLONOSCOPY N/A 11/4/2021    Procedure: COLONOSCOPY WITH ANESTHESIA;  Surgeon: Boris Castañeda MD;  Location: [facility] ENDOSCOPY;  Service: Gastroenterology;  Laterality: N/A;  pre-hx polyps  post-tics, colon polyp  [facility]    ENDOSCOPY  2006    states hiatal hernia    ENDOSCOPY N/A 12/16/2016    Procedure: ESOPHAGOGASTRODUODENOSCOPY WITH ANESTHESIA;  Surgeon: Boris Castañeda MD;  Location: [facility] ENDOSCOPY;  Service:     EYE SURGERY      KNEE SURGERY         Outpatient Medications Marked as Taking for the 12/20/24 encounter (Office Visit) with Soniya Palomino APRN   Medication Sig Dispense Refill    aspirin 81 MG EC tablet Take 1 tablet by mouth Daily.      atorvastatin (LIPITOR) 40 MG tablet Take 1 tablet by mouth once daily 90 tablet 3    cetirizine (zyrTEC) 10 MG tablet Take 1 tablet by mouth Daily.      dilTIAZem CD (Cardizem CD) 180 MG 24 hr capsule Take 1 capsule by mouth Daily. 90 capsule 3    FeroSul 325 (65 Fe) MG tablet Take 1 tablet by mouth  Daily.      hydroCHLOROthiazide 25 MG tablet Take 1 tablet by mouth once daily 90 tablet 3    HYDROcodone-acetaminophen (NORCO) 7.5-325 MG per tablet       isosorbide mononitrate (IMDUR) 60 MG 24 hr tablet Take 1 tablet by mouth Every Morning. 30 tablet 11    losartan (COZAAR) 100 MG tablet Take 1 tablet by mouth once daily 90 tablet 3    Multiple Vitamins-Minerals (CENTRUM SILVER PO) Take  by mouth.      nitroglycerin (NITROSTAT) 0.4 MG SL tablet 1 under the tongue as needed for angina, may repeat q5mins for up three doses 30 tablet 1    pantoprazole (PROTONIX) 40 MG EC tablet TK 1 T PO Q MORNING  3    POTASSIUM CHLORIDE PO Take  by mouth.         Allergies   Allergen Reactions    Other Other (See Comments)     Patient states he is allergic to some abx but cant name them. Says it caused thrush.    Ranexa [Ranolazine] Nausea Only       Social History     Socioeconomic History    Marital status: Single   Tobacco Use    Smoking status: Every Day     Current packs/day: 1.00     Types: Cigarettes     Passive exposure: Never    Smokeless tobacco: Never   Vaping Use    Vaping status: Never Used   Substance and Sexual Activity    Alcohol use: Not Currently    Drug use: No    Sexual activity: Defer       Family History   Problem Relation Age of Onset    Colon cancer Father     Colon polyps Sister     Colon cancer Brother     Heart disease Mother        Review of Systems   Constitutional:  Negative for chills, fever and unexpected weight change.   Respiratory:  Negative for shortness of breath.    Cardiovascular:  Negative for chest pain.   Gastrointestinal:  Negative for abdominal distention, abdominal pain, anal bleeding, blood in stool, constipation, diarrhea, nausea and vomiting.       Objective     Vitals:    12/20/24 0814   BP: 128/60   Pulse: 50   Temp: 97.7 °F (36.5 °C)   SpO2: 100%         12/20/24 0814   Weight: 81.6 kg (180 lb)     Body mass index is 26.58 kg/m².    Physical Exam  Vitals reviewed.    Constitutional:       General: He is not in acute distress.  Cardiovascular:      Rate and Rhythm: Normal rate and regular rhythm.      Heart sounds: Normal heart sounds.   Pulmonary:      Effort: Pulmonary effort is normal.      Breath sounds: Normal breath sounds.   Abdominal:      General: Bowel sounds are normal. There is no distension.      Palpations: Abdomen is soft.      Tenderness: There is no abdominal tenderness.   Skin:     General: Skin is warm and dry.   Neurological:      Mental Status: He is alert.         Imaging Results (Most Recent)       None            Assessment & Plan     Diagnoses and all orders for this visit:    1. History of adenomatous polyp of colon (Primary)  -     Case Request; Standing  -     Case Request    2. Family hx colonic polyps  -     Case Request; Standing  -     Case Request    3. Family hx of colon cancer  -     Case Request; Standing  -     Case Request    4. RUQ pain  Comments:  resolved    5. Abnormal CT of the abdomen  Comments:  chronic, stable,  gastric thickening on imaging.  Orders:  -     Case Request; Standing  -     Case Request    Other orders  -     Implement Anesthesia Orders Day of Procedure; Standing  -     Follow Anesthesia Guidelines / Protocol; Future      Schedule colonoscopy. Miralax prep.        In regards to abnormal ct,  I recommend egd and he is agreeable. Differential diagnoses discussed.   Continue protonix daily.         ESOPHAGOGASTRODUODENOSCOPY WITH ANESTHESIA (N/A), COLONOSCOPY WITH ANESTHESIA (N/A)  All risks, benefits, alternatives, and indications of colonoscopy procedure have been discussed with the patient. Risks to include perforation of the colon requiring possible surgery or colostomy, risk of bleeding from biopsies or removal of colon tissue, possibility of missing a colon polyp or cancer, or adverse drug reaction.  Benefits to include the diagnosis and management of disease of the colon and rectum. Alternatives to include barium  enema, radiographic evaluation, lab testing or no intervention. Pt verbalizes understanding and agrees.     Risk, benefits, and alternatives of endoscopy were explained in full.  They understand that there is a risk of bleeding, perforation, and infection.  The risk of perforation goes up with esophageal dilation.  Other options to evaluate UGI complaints could involve barium swallow or UGI series, but these would be diagnostic tests only.  Patient was given time to ask questions.  I answered them to their satisfaction and they are agreeable to proceeding.         There are no Patient Instructions on file for this visit.    Soniya Palomino, APRN

## 2025-01-08 ENCOUNTER — OFFICE VISIT (OUTPATIENT)
Dept: CARDIOLOGY | Facility: CLINIC | Age: 70
End: 2025-01-08
Payer: MEDICARE

## 2025-01-08 VITALS
HEART RATE: 83 BPM | BODY MASS INDEX: 26.96 KG/M2 | HEIGHT: 69 IN | WEIGHT: 182 LBS | DIASTOLIC BLOOD PRESSURE: 58 MMHG | SYSTOLIC BLOOD PRESSURE: 122 MMHG | OXYGEN SATURATION: 98 %

## 2025-01-08 DIAGNOSIS — I20.89 ANGINA AT REST: Primary | ICD-10-CM

## 2025-01-08 DIAGNOSIS — I10 ESSENTIAL HYPERTENSION: ICD-10-CM

## 2025-01-08 DIAGNOSIS — E78.49 OTHER HYPERLIPIDEMIA: ICD-10-CM

## 2025-01-08 NOTE — PROGRESS NOTES
Subjective:     Encounter Date:01/08/2025      Patient ID: Dereje Salazar is a 69 y.o. male.    Chief Complaint:  History of Present Illness  History of Present Illness  The patient presents for evaluation of chest pain.    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 He has not been taking the isosorbide as he has not experienced severe pain. He does not experience palpitations, skipped heartbeats, or fluttering sensations. He does not require any medication refills at this time. These episodes typically occur during periods of increased physical activity in warm weather. He was previously prescribed Ranexa but discontinued its use due to gastrointestinal side effects. He is currently on a regimen of diltiazem 180 mg, baby aspirin, cholesterol medication, losartan, and hydrochlorothiazide.    FAMILY HISTORY  The patient reports a family history of atrial fibrillation.    MEDICATIONS  Current: Diltiazem, baby aspirin, losartan, hydrochlorothiazide  Discontinued: Ranexa    The following portions of the patient's history were reviewed and updated as appropriate: allergies, current medications, past family history, past medical history, past social history, past surgical history, and problem list.    Review of Systems   Constitutional: Negative for diaphoresis, fever and malaise/fatigue.   HENT:  Negative for congestion.    Eyes:  Negative for vision loss in left eye and vision loss in right eye.   Cardiovascular:  Positive for chest pain. Negative for claudication, dyspnea on exertion, irregular heartbeat, leg swelling, orthopnea, palpitations and syncope.   Respiratory:  Negative for cough, shortness of breath and wheezing.    Hematologic/Lymphatic: Negative for adenopathy.   Skin:  Negative for rash.   Musculoskeletal:  Negative for joint pain and joint swelling.   Gastrointestinal:  Negative for abdominal pain, diarrhea, nausea and vomiting.   Neurological:  Negative  for excessive daytime sleepiness, dizziness, focal weakness, light-headedness, numbness and weakness.   Psychiatric/Behavioral:  Negative for depression. The patient does not have insomnia.            Current Outpatient Medications:     aspirin 81 MG EC tablet, Take 1 tablet by mouth Daily., Disp: , Rfl:     atorvastatin (LIPITOR) 40 MG tablet, Take 1 tablet by mouth once daily, Disp: 90 tablet, Rfl: 3    cetirizine (zyrTEC) 10 MG tablet, Take 1 tablet by mouth Daily., Disp: , Rfl:     dilTIAZem CD (Cardizem CD) 180 MG 24 hr capsule, Take 1 capsule by mouth Daily., Disp: 90 capsule, Rfl: 3    FeroSul 325 (65 Fe) MG tablet, Take 1 tablet by mouth Daily., Disp: , Rfl:     hydroCHLOROthiazide 25 MG tablet, Take 1 tablet by mouth once daily, Disp: 90 tablet, Rfl: 3    HYDROcodone-acetaminophen (NORCO) 7.5-325 MG per tablet, , Disp: , Rfl:     losartan (COZAAR) 100 MG tablet, Take 1 tablet by mouth once daily, Disp: 90 tablet, Rfl: 3    Multiple Vitamins-Minerals (CENTRUM SILVER PO), Take  by mouth., Disp: , Rfl:     nitroglycerin (NITROSTAT) 0.4 MG SL tablet, 1 under the tongue as needed for angina, may repeat q5mins for up three doses, Disp: 30 tablet, Rfl: 1    pantoprazole (PROTONIX) 40 MG EC tablet, TK 1 T PO Q MORNING, Disp: , Rfl: 3    POTASSIUM CHLORIDE PO, Take  by mouth., Disp: , Rfl:        Objective:      Vitals:    01/08/25 0859   BP: 122/58   Pulse: 83   SpO2: 98%     Vitals and nursing note reviewed.   Constitutional:       Appearance: Normal and healthy appearance. Well-developed and not in distress.   Eyes:      Extraocular Movements: Extraocular movements intact.      Pupils: Pupils are equal, round, and reactive to light.   HENT:      Head: Normocephalic and atraumatic.    Mouth/Throat:      Pharynx: Oropharynx is clear.   Neck:      Vascular: JVD normal.      Trachea: Trachea normal.   Pulmonary:      Effort: Pulmonary effort is normal.      Breath sounds: Normal breath sounds. No wheezing. No  rhonchi. No rales.   Cardiovascular:      PMI at left midclavicular line. Normal rate. Regular rhythm. Normal S1. Normal S2.       Murmurs: There is a grade 2/6 systolic murmur.      No gallop.  No click. No rub.   Pulses:     Dorsalis pedis: 2+ bilaterally.     Posterior tibial: 2+ bilaterally.  Abdominal:      General: Bowel sounds are normal.      Palpations: Abdomen is soft.      Tenderness: There is no abdominal tenderness.   Musculoskeletal: Normal range of motion.      Cervical back: Normal range of motion and neck supple. Skin:     General: Skin is warm and dry.      Capillary Refill: Capillary refill takes less than 2 seconds.   Feet:      Right foot:      Skin integrity: Skin integrity normal.      Left foot:      Skin integrity: Skin integrity normal.   Neurological:      Mental Status: Alert and oriented to person, place and time.      Sensory: Sensation is intact.      Motor: Motor function is intact.      Coordination: Coordination is intact.   Psychiatric:         Speech: Speech normal.         Behavior: Behavior is cooperative.       Physical Exam      Lab Review:       Procedures  Results  Testing  Stress test showed no major blockage.    Assessment/Plan:     Problem List Items Addressed This Visit       Essential hypertension    Other hyperlipidemia    Angina at rest - Primary     Assessment & Plan  1. Chest pain.  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 His cardiac function is robust, with no significant blockages detected. The stress test results were initially concerning, but further evaluation revealed no cause for alarm. Given the current state of his arteries, it is unlikely that he will require another heart catheterization in the future. He is advised to continue his current medication regimen, including diltiazem 180 mg, baby aspirin,  cholesterol medication, losartan, and hydrochlorothiazide. He should refrain from taking isosorbide unless absolutely necessary due to its potential side effect of severe headaches.    Follow-up  The patient will follow up in 6 months.      Recommendations/plans:    Transcribed from ambient dictation for Finn Mustafa DO by Finn Mustafa DO.  01/08/25   12:09 CST    Patient or patient representative verbalized consent for the use of Ambient Listening during the visit with  Finn Mustafa DO for chart documentation. 1/8/2025  12:10 CST

## 2025-03-14 ENCOUNTER — ANESTHESIA EVENT (OUTPATIENT)
Dept: GASTROENTEROLOGY | Facility: HOSPITAL | Age: 70
End: 2025-03-14
Payer: MEDICARE

## 2025-03-14 ENCOUNTER — ANESTHESIA (OUTPATIENT)
Dept: GASTROENTEROLOGY | Facility: HOSPITAL | Age: 70
End: 2025-03-14
Payer: MEDICARE

## 2025-03-14 ENCOUNTER — HOSPITAL ENCOUNTER (OUTPATIENT)
Facility: HOSPITAL | Age: 70
Setting detail: HOSPITAL OUTPATIENT SURGERY
Discharge: HOME | End: 2025-03-14
Attending: INTERNAL MEDICINE | Admitting: INTERNAL MEDICINE
Payer: MEDICARE

## 2025-03-14 VITALS
RESPIRATION RATE: 15 BRPM | SYSTOLIC BLOOD PRESSURE: 120 MMHG | DIASTOLIC BLOOD PRESSURE: 62 MMHG | HEIGHT: 69 IN | BODY MASS INDEX: 25.92 KG/M2 | WEIGHT: 175 LBS | HEART RATE: 63 BPM | OXYGEN SATURATION: 99 % | TEMPERATURE: 96.6 F

## 2025-03-14 DIAGNOSIS — Z86.0101 HISTORY OF ADENOMATOUS POLYP OF COLON: ICD-10-CM

## 2025-03-14 DIAGNOSIS — R93.5 ABNORMAL CT OF THE ABDOMEN: ICD-10-CM

## 2025-03-14 DIAGNOSIS — Z83.719 FAMILY HX COLONIC POLYPS: ICD-10-CM

## 2025-03-14 DIAGNOSIS — Z80.0 FAMILY HX OF COLON CANCER: ICD-10-CM

## 2025-03-14 PROCEDURE — 43235 EGD DIAGNOSTIC BRUSH WASH: CPT | Performed by: INTERNAL MEDICINE

## 2025-03-14 PROCEDURE — 25010000002 LIDOCAINE PF 2% 2 % SOLUTION: Performed by: NURSE ANESTHETIST, CERTIFIED REGISTERED

## 2025-03-14 PROCEDURE — 25010000002 PROPOFOL 10 MG/ML EMULSION: Performed by: NURSE ANESTHETIST, CERTIFIED REGISTERED

## 2025-03-14 PROCEDURE — 88305 TISSUE EXAM BY PATHOLOGIST: CPT | Performed by: INTERNAL MEDICINE

## 2025-03-14 PROCEDURE — 25810000003 SODIUM CHLORIDE 0.9 % SOLUTION: Performed by: ANESTHESIOLOGY

## 2025-03-14 PROCEDURE — 45385 COLONOSCOPY W/LESION REMOVAL: CPT | Performed by: INTERNAL MEDICINE

## 2025-03-14 RX ORDER — SODIUM CHLORIDE 9 MG/ML
500 INJECTION, SOLUTION INTRAVENOUS CONTINUOUS
Status: DISPENSED | OUTPATIENT
Start: 2025-03-14 | End: 2025-03-14

## 2025-03-14 RX ORDER — SODIUM CHLORIDE 0.9 % (FLUSH) 0.9 %
10 SYRINGE (ML) INJECTION AS NEEDED
Status: DISCONTINUED | OUTPATIENT
Start: 2025-03-14 | End: 2025-03-14 | Stop reason: HOSPADM

## 2025-03-14 RX ORDER — LIDOCAINE HYDROCHLORIDE 10 MG/ML
0.5 INJECTION, SOLUTION EPIDURAL; INFILTRATION; INTRACAUDAL; PERINEURAL ONCE AS NEEDED
Status: DISCONTINUED | OUTPATIENT
Start: 2025-03-14 | End: 2025-03-14 | Stop reason: HOSPADM

## 2025-03-14 RX ORDER — ONDANSETRON 2 MG/ML
4 INJECTION INTRAMUSCULAR; INTRAVENOUS ONCE AS NEEDED
Status: DISCONTINUED | OUTPATIENT
Start: 2025-03-14 | End: 2025-03-14 | Stop reason: HOSPADM

## 2025-03-14 RX ORDER — PROPOFOL 10 MG/ML
VIAL (ML) INTRAVENOUS AS NEEDED
Status: DISCONTINUED | OUTPATIENT
Start: 2025-03-14 | End: 2025-03-14 | Stop reason: SURG

## 2025-03-14 RX ORDER — LIDOCAINE HYDROCHLORIDE 20 MG/ML
INJECTION, SOLUTION EPIDURAL; INFILTRATION; INTRACAUDAL; PERINEURAL AS NEEDED
Status: DISCONTINUED | OUTPATIENT
Start: 2025-03-14 | End: 2025-03-14 | Stop reason: SURG

## 2025-03-14 RX ADMIN — PROPOFOL 50 MG: 10 INJECTION, EMULSION INTRAVENOUS at 10:08

## 2025-03-14 RX ADMIN — PROPOFOL 50 MG: 10 INJECTION, EMULSION INTRAVENOUS at 10:01

## 2025-03-14 RX ADMIN — SODIUM CHLORIDE 500 ML: 9 INJECTION, SOLUTION INTRAVENOUS at 08:41

## 2025-03-14 RX ADMIN — PROPOFOL 50 MG: 10 INJECTION, EMULSION INTRAVENOUS at 10:18

## 2025-03-14 RX ADMIN — PROPOFOL 50 MG: 10 INJECTION, EMULSION INTRAVENOUS at 10:25

## 2025-03-14 RX ADMIN — PROPOFOL 50 MG: 10 INJECTION, EMULSION INTRAVENOUS at 10:03

## 2025-03-14 RX ADMIN — PROPOFOL 50 MG: 10 INJECTION, EMULSION INTRAVENOUS at 10:14

## 2025-03-14 RX ADMIN — PROPOFOL 50 MG: 10 INJECTION, EMULSION INTRAVENOUS at 10:16

## 2025-03-14 RX ADMIN — PROPOFOL 50 MG: 10 INJECTION, EMULSION INTRAVENOUS at 10:06

## 2025-03-14 RX ADMIN — LIDOCAINE HYDROCHLORIDE 100 MG: 20 INJECTION, SOLUTION EPIDURAL; INFILTRATION; INTRACAUDAL; PERINEURAL at 10:01

## 2025-03-14 RX ADMIN — PROPOFOL 50 MG: 10 INJECTION, EMULSION INTRAVENOUS at 10:21

## 2025-03-14 NOTE — ANESTHESIA POSTPROCEDURE EVALUATION
Patient: Dereje Salazar    Procedure Summary       Date: 03/14/25 Room / Location: [facility] ENDOSCOPY 6 / [facility]    Anesthesia Start: 0957 Anesthesia Stop: 1034    Procedures:       ESOPHAGOGASTRODUODENOSCOPY WITH ANESTHESIA      COLONOSCOPY WITH ANESTHESIA Diagnosis:       History of adenomatous polyp of colon      Family hx colonic polyps      Family hx of colon cancer      Abnormal CT of the abdomen      (History of adenomatous polyp of colon [Z86.0101])      (Family hx colonic polyps [Z83.719])      (Family hx of colon cancer [Z80.0])      (Abnormal CT of the abdomen [R93.5])    Surgeons: Boris Castañeda MD Provider: Tahmina Vick CRNA    Anesthesia Type: MAC ASA Status: 2            Anesthesia Type: MAC    Vitals  Vitals Value Taken Time   /70 03/14/25 10:55   Temp     Pulse 58 03/14/25 10:56   Resp 15 03/14/25 10:55   SpO2 99 % 03/14/25 10:56   Vitals shown include unfiled device data.        Post Anesthesia Care and Evaluation    Patient location during evaluation: PHASE II  Patient participation: complete - patient participated  Level of consciousness: awake and alert  Pain score: 0  Pain management: adequate    Airway patency: patent  Anesthetic complications: No anesthetic complications  PONV Status: none  Cardiovascular status: acceptable  Respiratory status: acceptable  Hydration status: acceptable  No anesthesia care post op

## 2025-03-14 NOTE — H&P
[facility] Gastroenterology  Pre Procedure History & Physical    Chief Complaint:   Colon polyps    Subjective     HPI:   The patient has a history of colon polyps who presents for exam.  He has a remote history of adenomatous polyps.  He has family is colon cancer.  He presents for colonoscopy exam.  Also scheduled for endoscopy evaluation abnormal CT showing some thickening in the stomach.  Denies any stomach troubles or discomfort    Past Medical History:   Past Medical History:   Diagnosis Date    Colon polyps     Diverticulosis     GERD (gastroesophageal reflux disease)     Heart disorder     Hyperlipidemia     Hypertension     IBS (irritable bowel syndrome)     Knee pain        Past Surgical History:  Past Surgical History:   Procedure Laterality Date    CARDIAC CATHETERIZATION Right 07/15/2021    Procedure: Left Heart Cath w poss intervention, right radial, US guided;  Surgeon: Finn Mustafa DO;  Location:  [facility] CATH INVASIVE LOCATION;  Service: Cardiovascular;  Laterality: Right;    CARDIAC CATHETERIZATION N/A 10/08/2024    Procedure: Left Heart Cath;  Surgeon: Finn Mustafa DO;  Location:  [facility] CATH INVASIVE LOCATION;  Service: Cardiovascular;  Laterality: N/A;    COLONOSCOPY  2006    polyps present    COLONOSCOPY  08/20/2010    clean based ulcerations covering ton lumen @ hepatic flexure, (5) polypectomies, moderate diverticulosis left colon    COLONOSCOPY  11/12/2010    resolved colonic ulcerations, small polypectomies, moderate diverticulosis    COLONOSCOPY  12/31/2013    COLONOSCOPY N/A 12/16/2016    Procedure: COLONOSCOPY WITH ANESTHESIA;  Surgeon: Boris Castañeda MD;  Location: [facility] ENDOSCOPY;  Service:     COLONOSCOPY N/A 11/04/2021    Procedure: COLONOSCOPY WITH ANESTHESIA;  Surgeon: Boris Castañeda MD;  Location: [facility] ENDOSCOPY;  Service: Gastroenterology;  Laterality: N/A;  pre-hx polyps  post-tics, colon polyp  [facility]    ENDOSCOPY  2006    states  hiatal hernia    ENDOSCOPY N/A 12/16/2016    Procedure: ESOPHAGOGASTRODUODENOSCOPY WITH ANESTHESIA;  Surgeon: Boris Castañeda MD;  Location: [facility] ENDOSCOPY;  Service:     EYE SURGERY      KNEE SURGERY         Family History:  Family History   Problem Relation Age of Onset    Colon cancer Father     Colon polyps Sister     Colon cancer Brother     Heart disease Mother        Social History:   reports that he has been smoking cigarettes. He has never been exposed to tobacco smoke. He has never used smokeless tobacco. He reports that he does not currently use alcohol. He reports that he does not use drugs.    Medications:   Prior to Admission medications    Medication Sig Start Date End Date Taking? Authorizing Provider   aspirin 81 MG EC tablet Take 1 tablet by mouth Daily.   Yes Marsha Rosas MD   atorvastatin (LIPITOR) 40 MG tablet Take 1 tablet by mouth once daily 6/17/24  Yes Finn Mustafa DO   cetirizine (zyrTEC) 10 MG tablet Take 1 tablet by mouth Daily.   Yes Marsha Rosas MD   dilTIAZem CD (Cardizem CD) 180 MG 24 hr capsule Take 1 capsule by mouth Daily. 8/20/24  Yes Finn Mustafa DO   FeroSul 325 (65 Fe) MG tablet Take 1 tablet by mouth Daily. 8/14/24  Yes Marsha Rosas MD   hydroCHLOROthiazide 25 MG tablet Take 1 tablet by mouth once daily 6/17/24  Yes Finn Mustafa DO   HYDROcodone-acetaminophen (NORCO) 7.5-325 MG per tablet  10/10/16  Yes Marsha Rosas MD   losartan (COZAAR) 100 MG tablet Take 1 tablet by mouth once daily 6/17/24  Yes Finn Mustafa DO   Multiple Vitamins-Minerals (CENTRUM SILVER PO) Take  by mouth.   Yes Marsha Rosas MD   pantoprazole (PROTONIX) 40 MG EC tablet TK 1 T PO Q MORNING 8/10/16  Yes Marsha Rosas MD   POTASSIUM CHLORIDE PO Take  by mouth.   Yes Marsha Rosas MD   nitroglycerin (NITROSTAT) 0.4 MG SL tablet 1 under the tongue as needed for angina, may repeat q5mins for  "up three doses 6/17/22   Finn Mustafa, DO       Allergies:  Other and Ranexa [ranolazine]    ROS:    General: Weight stable  Resp: No SOA  Cardiovascular: No CP    Objective     Blood pressure 115/63, pulse 76, temperature 96.6 °F (35.9 °C), temperature source Temporal, resp. rate 18, height 175.3 cm (69\"), weight 79.4 kg (175 lb), SpO2 97%.    Physical Exam   Constitutional: Pt is oriented to person, place, and in no distress.   Cardiovascular: Normal rate, regular rhythm.    Pulmonary/Chest: Effort normal. No respiratory distress.   Abdominal:  Non-distended.  Psychiatric: Mood, memory, affect and judgment appear normal.     Assessment & Plan     Diagnosis:  Colon polyps, family history colon cancer, abnormal CT    Anticipated Surgical Procedure:  Colonoscopy, endoscopy    The risks, benefits, and alternatives of this procedure have been discussed with the patient or the responsible party- the patient understands and agrees to proceed.      EMR Dragon/transcription disclaimer:  Much of this encounter note is electronic transcription/translation of spoken language to printed text.  The electronic translation of spoken language may be erroneous, or at times, nonsensical words or phrases may be inadvertently transcribed.  Although I have reviewed the note for such errors, some may still exist.  "

## 2025-03-14 NOTE — ANESTHESIA PREPROCEDURE EVALUATION
Anesthesia Evaluation     Patient summary reviewed   no history of anesthetic complications:   NPO Solid Status: > 8 hours             Airway   Mallampati: II  Dental      Pulmonary    (+) a smoker,  Cardiovascular   Exercise tolerance: good (4-7 METS)    (+) hypertension, hyperlipidemia      Neuro/Psych- negative ROS  GI/Hepatic/Renal/Endo    (+) GERD    Musculoskeletal     Abdominal    Substance History      OB/GYN          Other                          Anesthesia Plan    ASA 2     MAC       Anesthetic plan, risks, benefits, and alternatives have been provided, discussed and informed consent has been obtained with: patient.        CODE STATUS:

## 2025-03-17 LAB
CYTO UR: NORMAL
LAB AP CASE REPORT: NORMAL
Lab: NORMAL
PATH REPORT.FINAL DX SPEC: NORMAL
PATH REPORT.GROSS SPEC: NORMAL

## 2025-05-13 ENCOUNTER — TELEPHONE (OUTPATIENT)
Dept: UROLOGY | Age: 70
End: 2025-05-13

## 2025-05-13 DIAGNOSIS — N50.812 PAIN IN LEFT TESTICLE: Primary | ICD-10-CM

## 2025-05-13 NOTE — TELEPHONE ENCOUNTER
Bebeto called to schedule appt. Having pain in left testicle. [facility] couldn't accommodate. Please advise.

## 2025-05-15 ENCOUNTER — HOSPITAL ENCOUNTER (OUTPATIENT)
Dept: ULTRASOUND IMAGING | Age: 70
Discharge: HOME | End: 2025-05-15
Payer: MEDICARE

## 2025-05-15 DIAGNOSIS — N50.812 PAIN IN LEFT TESTICLE: ICD-10-CM

## 2025-05-15 PROCEDURE — 76870 US EXAM SCROTUM: CPT

## 2025-05-21 ENCOUNTER — OFFICE VISIT (OUTPATIENT)
Dept: UROLOGY | Age: 70
End: 2025-05-21
Payer: MEDICARE

## 2025-05-21 VITALS — BODY MASS INDEX: 26.22 KG/M2 | HEIGHT: 69 IN | TEMPERATURE: 97.9 F | WEIGHT: 177 LBS

## 2025-05-21 DIAGNOSIS — R10.31 RIGHT GROIN PAIN: ICD-10-CM

## 2025-05-21 DIAGNOSIS — N43.40 SPERMATOCELE: Primary | ICD-10-CM

## 2025-05-21 LAB
APPEARANCE FLUID: CLEAR
BILIRUBIN, POC: NORMAL
BLOOD URINE, POC: NORMAL
CLARITY, POC: CLEAR
COLOR, POC: YELLOW
GLUCOSE URINE, POC: NORMAL MG/DL
KETONES, POC: NORMAL MG/DL
LEUKOCYTE EST, POC: NORMAL
NITRITE, POC: NORMAL
PH, POC: 7
PROTEIN, POC: NORMAL MG/DL
SPECIFIC GRAVITY, POC: 1.01
UROBILINOGEN, POC: 0.2 MG/DL

## 2025-05-21 PROCEDURE — 99214 OFFICE O/P EST MOD 30 MIN: CPT | Performed by: NURSE PRACTITIONER

## 2025-05-21 PROCEDURE — 4004F PT TOBACCO SCREEN RCVD TLK: CPT | Performed by: NURSE PRACTITIONER

## 2025-05-21 PROCEDURE — 1123F ACP DISCUSS/DSCN MKR DOCD: CPT | Performed by: NURSE PRACTITIONER

## 2025-05-21 PROCEDURE — 1159F MED LIST DOCD IN RCRD: CPT | Performed by: NURSE PRACTITIONER

## 2025-05-21 PROCEDURE — G8419 CALC BMI OUT NRM PARAM NOF/U: HCPCS | Performed by: NURSE PRACTITIONER

## 2025-05-21 PROCEDURE — 3017F COLORECTAL CA SCREEN DOC REV: CPT | Performed by: NURSE PRACTITIONER

## 2025-05-21 PROCEDURE — G8427 DOCREV CUR MEDS BY ELIG CLIN: HCPCS | Performed by: NURSE PRACTITIONER

## 2025-05-21 PROCEDURE — 81002 URINALYSIS NONAUTO W/O SCOPE: CPT | Performed by: NURSE PRACTITIONER

## 2025-05-21 RX ORDER — KETOROLAC TROMETHAMINE 10 MG/1
10 TABLET, FILM COATED ORAL EVERY 6 HOURS PRN
Qty: 20 TABLET | Refills: 0 | Status: SHIPPED | OUTPATIENT
Start: 2025-05-21

## 2025-05-21 ASSESSMENT — ENCOUNTER SYMPTOMS
DIARRHEA: 0
CONSTIPATION: 0
VOMITING: 0
COUGH: 0
ABDOMINAL PAIN: 0
ABDOMINAL DISTENTION: 0
TROUBLE SWALLOWING: 0
NAUSEA: 0
EYE REDNESS: 0
EYE DISCHARGE: 0
BACK PAIN: 0
SHORTNESS OF BREATH: 0

## 2025-05-21 NOTE — PROGRESS NOTES
Bebeto Trujillo is a 69 y.o. male who presents today   Chief Complaint   Patient presents with    Follow-up     RIGHT TESTICULAR PAIN/SWELLING, U/ S done.        Patient is a 69-year-old male presents clinic today with complaints of right groin pain.  Reports over a month ago he stepped off a tall curb and had some severe right groin pain and burning.  It has been severe at times he seems to notice it more with pushing, pulling, bending.  Otherwise no significant pain or discomfort.  He does feel like over the last month even before he made this appointment his pain has started to improve.  He does have a known large right spermatocele he was previously evaluated Dr. Perdomo on 12/19/2024.  Spermatocele has slightly increased in size from 6.3 x 6.9 cm cyst now measuring 7.7 x 6.5 cm.  He denies any testicular pain.  Occasionally this does get in the way but denies any pain.  Denies any lower urinary tract symptoms.    Past Medical History:   Diagnosis Date    Colon polyp     Diverticulosis     GERD (gastroesophageal reflux disease)     Heart disorder     Hyperlipemia     Hypertension     IBS (irritable bowel syndrome)        Past Surgical History:   Procedure Laterality Date    CARDIAC CATHETERIZATION  07/15/2021    COLONOSCOPY  2006    COLONOSCOPY  2021    EYE SURGERY      KNEE SURGERY      UPPER ENDOSCOPY W/ SCLEROTHERAPY  2016       Current Outpatient Medications   Medication Sig Dispense Refill    ketorolac (TORADOL) 10 MG tablet Take 1 tablet by mouth every 6 hours as needed for Pain 20 tablet 0    aspirin 81 MG EC tablet Take 1 tablet by mouth daily      atorvastatin (LIPITOR) 40 MG tablet Take 1 tablet by mouth daily      cetirizine (ZYRTEC) 10 MG tablet Take 1 tablet by mouth daily      dilTIAZem (CARDIZEM CD) 180 MG extended release capsule Take 1 capsule by mouth daily      FEROSUL 325 (65 Fe) MG tablet Take 1 tablet by mouth daily      hydroCHLOROthiazide (HYDRODIURIL) 25 MG tablet Take 1 tablet by mouth

## 2025-05-21 NOTE — PROGRESS NOTES
Bebeto Trujillo is a 69 y.o. male who presents today   Chief Complaint   Patient presents with    Follow-up     RIGHT TESTICULAR PAIN/SWELLING, U/ S done.        {HPI:[id number]}    Past Medical History:   Diagnosis Date    Colon polyp     Diverticulosis     GERD (gastroesophageal reflux disease)     Heart disorder     Hyperlipemia     Hypertension     IBS (irritable bowel syndrome)        Past Surgical History:   Procedure Laterality Date    CARDIAC CATHETERIZATION  07/15/2021    COLONOSCOPY  2006    COLONOSCOPY  2021    EYE SURGERY      KNEE SURGERY      UPPER ENDOSCOPY W/ SCLEROTHERAPY  2016       Current Outpatient Medications   Medication Sig Dispense Refill    aspirin 81 MG EC tablet Take 1 tablet by mouth daily      atorvastatin (LIPITOR) 40 MG tablet Take 1 tablet by mouth daily      cetirizine (ZYRTEC) 10 MG tablet Take 1 tablet by mouth daily      dilTIAZem (CARDIZEM CD) 180 MG extended release capsule Take 1 capsule by mouth daily      FEROSUL 325 (65 Fe) MG tablet Take 1 tablet by mouth daily      hydroCHLOROthiazide (HYDRODIURIL) 25 MG tablet Take 1 tablet by mouth daily      HYDROcodone-acetaminophen (NORCO) 7.5-325 MG per tablet Take 1 tablet by mouth 2 times daily as needed.      losartan (COZAAR) 100 MG tablet Take 1 tablet by mouth daily      pantoprazole (PROTONIX) 40 MG tablet Take 1 tablet by mouth 2 times daily      potassium chloride (KLOR-CON M) 10 MEQ extended release tablet Take 1 tablet by mouth daily       No current facility-administered medications for this visit.       Allergies   Allergen Reactions    Ranolazine Nausea Only       Social History     Socioeconomic History    Marital status:      Social Drivers of Health      Received from [facility]    Family and Community Support   Intimate Partner Violence: Not At Risk (3/14/2025)    Received from [facility]    Abuse Screen     Feels Unsafe at Home or Work/School: no     Feels Threatened by Someone: no

## 2025-06-09 ENCOUNTER — OFFICE VISIT (OUTPATIENT)
Dept: UROLOGY | Age: 70
End: 2025-06-09
Payer: MEDICARE

## 2025-06-09 VITALS — TEMPERATURE: 98.1 F | WEIGHT: 177 LBS | HEIGHT: 69 IN | BODY MASS INDEX: 26.22 KG/M2

## 2025-06-09 DIAGNOSIS — N43.40 SPERMATOCELE: ICD-10-CM

## 2025-06-09 DIAGNOSIS — R10.31 RIGHT GROIN PAIN: Primary | ICD-10-CM

## 2025-06-09 PROCEDURE — 3017F COLORECTAL CA SCREEN DOC REV: CPT | Performed by: NURSE PRACTITIONER

## 2025-06-09 PROCEDURE — 81002 URINALYSIS NONAUTO W/O SCOPE: CPT | Performed by: NURSE PRACTITIONER

## 2025-06-09 PROCEDURE — G8419 CALC BMI OUT NRM PARAM NOF/U: HCPCS | Performed by: NURSE PRACTITIONER

## 2025-06-09 PROCEDURE — 4004F PT TOBACCO SCREEN RCVD TLK: CPT | Performed by: NURSE PRACTITIONER

## 2025-06-09 PROCEDURE — 1159F MED LIST DOCD IN RCRD: CPT | Performed by: NURSE PRACTITIONER

## 2025-06-09 PROCEDURE — 99213 OFFICE O/P EST LOW 20 MIN: CPT | Performed by: NURSE PRACTITIONER

## 2025-06-09 PROCEDURE — 1123F ACP DISCUSS/DSCN MKR DOCD: CPT | Performed by: NURSE PRACTITIONER

## 2025-06-09 PROCEDURE — G8427 DOCREV CUR MEDS BY ELIG CLIN: HCPCS | Performed by: NURSE PRACTITIONER

## 2025-06-09 ASSESSMENT — ENCOUNTER SYMPTOMS
ABDOMINAL DISTENTION: 0
VOMITING: 0
ABDOMINAL PAIN: 1
BACK PAIN: 0
NAUSEA: 0

## 2025-06-09 NOTE — PROGRESS NOTES
Return for CT next jeannie- I will call with results .    All information inputted into the note by the MA to include chief complaint, past medical history, past surgical history, medications, allergies, social and family history and review of systems has been reviewed and updated as needed by me.    EMR Dragon/transcription disclaimer: Much of this documentt is electronic  transcription/translation of spoken language to printed text. The  electronic translation of spoken language may be erroneous, or at times,  nonsensical words or phrases may be inadvertently transcribed. Although I  have reviewed the document for such errors, some may still exist.

## 2025-06-10 NOTE — TELEPHONE ENCOUNTER
Caller: Dereje Salazar    Relationship: Self    Best call back number: [phone]     Requested Prescriptions:   Requested Prescriptions     Pending Prescriptions Disp Refills    dilTIAZem CD (Cardizem CD) 180 MG 24 hr capsule 90 capsule 3     Sig: Take 1 capsule by mouth Daily.    atorvastatin (LIPITOR) 40 MG tablet 90 tablet 3     Sig: Take 1 tablet by mouth Daily.    losartan (COZAAR) 100 MG tablet 90 tablet 3     Sig: Take 1 tablet by mouth Daily.    hydroCHLOROthiazide 25 MG tablet 90 tablet 3     Sig: Take 1 tablet by mouth Daily.        Pharmacy where request should be sent: [facility] [address] [place] [address] CHRISTOPHER FLORES [facility] [phone] [facility] [phone] FX     Last office visit with prescribing clinician: 1/8/2025   Last telemedicine visit with prescribing clinician: Visit date not found   Next office visit with prescribing clinician: 8/14/2025       Does the patient have less than a 3 day supply:  [] Yes  [x] No    Would you like a call back once the refill request has been completed:  Yes [x] No    If the office needs to give you a call back, can they leave a voicemail: [x][] Yes [x] No    Calos Pisano Rep   06/10/25 14:38 CDT

## 2025-06-12 RX ORDER — DILTIAZEM HYDROCHLORIDE 180 MG/1
180 CAPSULE, COATED, EXTENDED RELEASE ORAL DAILY
Qty: 90 CAPSULE | Refills: 3 | Status: SHIPPED | OUTPATIENT
Start: 2025-06-12

## 2025-06-12 RX ORDER — ATORVASTATIN CALCIUM 40 MG/1
40 TABLET, FILM COATED ORAL DAILY
Qty: 90 TABLET | Refills: 3 | Status: SHIPPED | OUTPATIENT
Start: 2025-06-12

## 2025-06-12 RX ORDER — LOSARTAN POTASSIUM 100 MG/1
100 TABLET ORAL DAILY
Qty: 90 TABLET | Refills: 3 | Status: SHIPPED | OUTPATIENT
Start: 2025-06-12

## 2025-06-12 RX ORDER — HYDROCHLOROTHIAZIDE 25 MG/1
25 TABLET ORAL DAILY
Qty: 90 TABLET | Refills: 3 | Status: SHIPPED | OUTPATIENT
Start: 2025-06-12

## 2025-06-19 ENCOUNTER — HOSPITAL ENCOUNTER (OUTPATIENT)
Dept: CT IMAGING | Age: 70
Discharge: HOME | End: 2025-06-19
Payer: MEDICARE

## 2025-06-19 DIAGNOSIS — R10.31 RIGHT GROIN PAIN: ICD-10-CM

## 2025-06-19 LAB — CREAT SERPL-MCNC: 0.9 MG/DL (ref 0.3–1.3)

## 2025-06-19 PROCEDURE — 6360000004 HC RX CONTRAST MEDICATION: Performed by: NURSE PRACTITIONER

## 2025-06-19 PROCEDURE — 72194 CT PELVIS W/O & W/DYE: CPT

## 2025-06-19 PROCEDURE — 82565 ASSAY OF CREATININE: CPT

## 2025-06-19 RX ORDER — IOPAMIDOL 755 MG/ML
75 INJECTION, SOLUTION INTRAVASCULAR
Status: COMPLETED | OUTPATIENT
Start: 2025-06-19 | End: 2025-06-19

## 2025-06-19 RX ADMIN — IOPAMIDOL 75 ML: 755 INJECTION, SOLUTION INTRAVENOUS at 09:33

## 2025-06-20 ENCOUNTER — RESULTS FOLLOW-UP (OUTPATIENT)
Dept: UROLOGY | Age: 70
End: 2025-06-20

## 2025-08-14 ENCOUNTER — OFFICE VISIT (OUTPATIENT)
Dept: CARDIOLOGY | Facility: CLINIC | Age: 70
End: 2025-08-14
Payer: MEDICARE

## 2025-08-14 VITALS
SYSTOLIC BLOOD PRESSURE: 116 MMHG | HEIGHT: 69 IN | BODY MASS INDEX: 26.19 KG/M2 | WEIGHT: 176.8 LBS | DIASTOLIC BLOOD PRESSURE: 62 MMHG | HEART RATE: 68 BPM | OXYGEN SATURATION: 96 %

## 2025-08-14 DIAGNOSIS — Q24.5 CORONARY-MYOCARDIAL BRIDGE: ICD-10-CM

## 2025-08-14 DIAGNOSIS — I10 ESSENTIAL HYPERTENSION: Primary | ICD-10-CM

## 2025-08-14 DIAGNOSIS — E78.49 OTHER HYPERLIPIDEMIA: ICD-10-CM

## 2025-08-14 PROCEDURE — 93000 ELECTROCARDIOGRAM COMPLETE: CPT | Performed by: EMERGENCY MEDICINE

## 2025-08-14 PROCEDURE — 99214 OFFICE O/P EST MOD 30 MIN: CPT | Performed by: EMERGENCY MEDICINE

## 2025-08-14 PROCEDURE — 3078F DIAST BP <80 MM HG: CPT | Performed by: EMERGENCY MEDICINE

## 2025-08-14 PROCEDURE — 3074F SYST BP LT 130 MM HG: CPT | Performed by: EMERGENCY MEDICINE

## (undated) DEVICE — TIBURON BRACHIAL ANGIOGRAPHY DRAPE: Brand: CONVERTORS

## (undated) DEVICE — DRSNG SURESITE WNDW 4X4.5

## (undated) DEVICE — SENSR O2 OXIMAX FNGR A/ 18IN NONSTR

## (undated) DEVICE — CUFF,BP,DISP,1 TUBE,ADULT,HP: Brand: MEDLINE

## (undated) DEVICE — A2000 MULTI-USE SYRINGE KIT, P/N 701277-003KIT CONTENTS: 100ML CONTRAST RESERVOIR AND TUBING WITH CONTRAST SPIKE AND CLAMP: Brand: A2000 MULTI-USE SYRINGE KIT

## (undated) DEVICE — THE SINGLE USE ETRAP – POLYP TRAP IS USED FOR SUCTION RETRIEVAL OF ENDOSCOPICALLY REMOVED POLYPS.: Brand: ETRAP

## (undated) DEVICE — PAD, DEFIB, ADULT, RADIOTRANS, PHILIPS: Brand: MEDLINE

## (undated) DEVICE — CONMED SCOPE SAVER BITE BLOCK, 20X27 MM: Brand: SCOPE SAVER

## (undated) DEVICE — SOL IRR NACL 0.9PCT BT 1000ML

## (undated) DEVICE — Device: Brand: SINGLE USE ELECTROSURGICAL SNARE SD-400

## (undated) DEVICE — SNAR POLYP SENSATION MICRO OVL 13 240X40

## (undated) DEVICE — Device: Brand: DEFENDO AIR/WATER/SUCTION AND BIOPSY VALVE

## (undated) DEVICE — MODEL BT2000 P/N 700287-012KIT CONTENTS: MANIFOLD WITH SALINE AND CONTRAST PORTS, SALINE TUBING WITH SPIKE AND HAND SYRINGE, TRANSDUCER: Brand: BT2000 AUTOMATED MANIFOLD KIT

## (undated) DEVICE — KT NDL GUIDE STRL 18GA

## (undated) DEVICE — THE CHANNEL CLEANING BRUSH IS A NYLON FLEXI BRUSH ATTACHED TO A FLEXIBLE PLASTIC SHEATH DESIGNED TO SAFELY REMOVE DEBRIS FROM FLEXIBLE ENDOSCOPES.

## (undated) DEVICE — DEFENDO AIR WATER SUCTION AND BIOPSY VALVE KIT FOR  OLYMPUS: Brand: DEFENDO AIR/WATER/SUCTION AND BIOPSY VALVE

## (undated) DEVICE — SOLIDIFIER LIQUI LOC PLUS 2000CC

## (undated) DEVICE — TBG SMPL FLTR LINE NASL 02/C02 A/ BX/100

## (undated) DEVICE — TR BAND RADIAL ARTERY COMPRESSION DEVICE: Brand: TR BAND

## (undated) DEVICE — SUP ARMBRD ART/LINE BLU

## (undated) DEVICE — PAD, DEFIB, ADULT, RADIOTRANS, PHYSIO: Brand: MEDLINE

## (undated) DEVICE — PK CATH CARD 30 CA/4

## (undated) DEVICE — PINNACLE INTRODUCER SHEATH: Brand: PINNACLE

## (undated) DEVICE — GW STARTER FXD CORE J .035 3X260CM 3MM

## (undated) DEVICE — CANN CO2/O2 NASL A/

## (undated) DEVICE — YANKAUER,BULB TIP WITH VENT: Brand: ARGYLE

## (undated) DEVICE — DRAPE,ANGIO,BRACH,STERILE,38X44: Brand: MEDLINE

## (undated) DEVICE — CATH DIAG IMPULSE PIG145 5F 110CM

## (undated) DEVICE — RADIFOCUS OPTITORQUE ANGIOGRAPHIC CATHETER: Brand: OPTITORQUE

## (undated) DEVICE — GW STARTER FXD/CORE PTFE/COAT J/TP/3MM .035IN 10X150CM

## (undated) DEVICE — CANN NASL ETCO2 LO/FLO A/

## (undated) DEVICE — GLIDESHEATH SLENDER STAINLESS STEEL KIT: Brand: GLIDESHEATH SLENDER

## (undated) DEVICE — MASK,OXYGEN,MED CONC,ADLT,7' TUB, UC: Brand: PENDING

## (undated) DEVICE — SOLIDIFIER LIQ LIQUILOC/PLUS W/TREAT 2000CC

## (undated) DEVICE — ARGYLE YANKAUER BULB TIP WITH VENT: Brand: ARGYLE

## (undated) DEVICE — ANGIO-SEAL VIP VASCULAR CLOSURE DEVICE: Brand: ANGIO-SEAL

## (undated) DEVICE — CATH DIAG IMPULSE M/ PK 145 5FR

## (undated) DEVICE — GW STARTER FXD/CORE PTFE/COAT J/TP/3MM .035IN 10X260CM

## (undated) DEVICE — MODEL AT P65, P/N 701554-001KIT CONTENTS: HAND CONTROLLER, 3-WAY HIGH-PRESSURE STOPCOCK WITH ROTATING END AND PREMIUM HIGH-PRESSURE TUBING: Brand: ANGIOTOUCH® KIT